# Patient Record
Sex: FEMALE | Race: WHITE | Employment: FULL TIME | URBAN - METROPOLITAN AREA
[De-identification: names, ages, dates, MRNs, and addresses within clinical notes are randomized per-mention and may not be internally consistent; named-entity substitution may affect disease eponyms.]

---

## 2017-01-09 DIAGNOSIS — M16.0 PRIMARY OSTEOARTHRITIS OF BOTH HIPS: ICD-10-CM

## 2017-01-09 RX ORDER — MELOXICAM 15 MG/1
TABLET ORAL
Qty: 30 TABLET | Refills: 2 | Status: SHIPPED | OUTPATIENT
Start: 2017-01-09 | End: 2017-01-23 | Stop reason: SDUPTHER

## 2017-01-18 ENCOUNTER — HOSPITAL ENCOUNTER (OUTPATIENT)
Dept: SURGERY | Age: 57
Discharge: HOME | End: 2017-01-18
Attending: INTERNAL MEDICINE | Admitting: INTERNAL MEDICINE

## 2017-01-18 VITALS
HEART RATE: 71 BPM | SYSTOLIC BLOOD PRESSURE: 127 MMHG | HEIGHT: 64 IN | OXYGEN SATURATION: 100 % | WEIGHT: 245 LBS | RESPIRATION RATE: 18 BRPM | BODY MASS INDEX: 41.83 KG/M2 | DIASTOLIC BLOOD PRESSURE: 84 MMHG | TEMPERATURE: 97.4 F

## 2017-01-18 RX ORDER — LIDOCAINE HYDROCHLORIDE 10 MG/ML
0.1 INJECTION, SOLUTION EPIDURAL; INFILTRATION; INTRACAUDAL; PERINEURAL ONCE
Status: DISCONTINUED | OUTPATIENT
Start: 2017-01-18 | End: 2017-01-19 | Stop reason: HOSPADM

## 2017-01-18 RX ORDER — CETIRIZINE HYDROCHLORIDE 10 MG/1
10 TABLET ORAL DAILY
COMMUNITY
End: 2017-10-16

## 2017-01-18 RX ORDER — SODIUM CHLORIDE, SODIUM LACTATE, POTASSIUM CHLORIDE, CALCIUM CHLORIDE 600; 310; 30; 20 MG/100ML; MG/100ML; MG/100ML; MG/100ML
INJECTION, SOLUTION INTRAVENOUS CONTINUOUS
Status: DISCONTINUED | OUTPATIENT
Start: 2017-01-18 | End: 2017-01-19 | Stop reason: HOSPADM

## 2017-01-18 RX ADMIN — SODIUM CHLORIDE, SODIUM LACTATE, POTASSIUM CHLORIDE, CALCIUM CHLORIDE: 600; 310; 30; 20 INJECTION, SOLUTION INTRAVENOUS at 08:28

## 2017-01-18 ASSESSMENT — PAIN - FUNCTIONAL ASSESSMENT: PAIN_FUNCTIONAL_ASSESSMENT: 0-10

## 2017-01-18 ASSESSMENT — PAIN SCALES - GENERAL: PAINLEVEL_OUTOF10: 0

## 2017-01-23 DIAGNOSIS — M16.0 PRIMARY OSTEOARTHRITIS OF BOTH HIPS: ICD-10-CM

## 2017-01-23 RX ORDER — MELOXICAM 15 MG/1
TABLET ORAL
Qty: 30 TABLET | Refills: 2 | Status: SHIPPED | OUTPATIENT
Start: 2017-01-23 | End: 2017-10-16 | Stop reason: SDUPTHER

## 2017-03-03 ENCOUNTER — OFFICE VISIT (OUTPATIENT)
Dept: FAMILY MEDICINE CLINIC | Age: 57
End: 2017-03-03

## 2017-03-03 VITALS
DIASTOLIC BLOOD PRESSURE: 88 MMHG | HEIGHT: 64 IN | OXYGEN SATURATION: 98 % | HEART RATE: 69 BPM | BODY MASS INDEX: 43.19 KG/M2 | WEIGHT: 253 LBS | SYSTOLIC BLOOD PRESSURE: 122 MMHG

## 2017-03-03 DIAGNOSIS — K63.5 COLONIC POLYP: ICD-10-CM

## 2017-03-03 DIAGNOSIS — I10 ESSENTIAL HYPERTENSION: Primary | ICD-10-CM

## 2017-03-03 DIAGNOSIS — M16.0 PRIMARY OSTEOARTHRITIS OF BOTH HIPS: ICD-10-CM

## 2017-03-03 DIAGNOSIS — M79.642 PAIN OF LEFT HAND: ICD-10-CM

## 2017-03-03 LAB
A/G RATIO: 1.4 (ref 1.1–2.2)
ALBUMIN SERPL-MCNC: 4.4 G/DL (ref 3.4–5)
ALP BLD-CCNC: 65 U/L (ref 40–129)
ALT SERPL-CCNC: 30 U/L (ref 10–40)
ANION GAP SERPL CALCULATED.3IONS-SCNC: 14 MMOL/L (ref 3–16)
AST SERPL-CCNC: 20 U/L (ref 15–37)
BILIRUB SERPL-MCNC: <0.2 MG/DL (ref 0–1)
BUN BLDV-MCNC: 17 MG/DL (ref 7–20)
CALCIUM SERPL-MCNC: 10.2 MG/DL (ref 8.3–10.6)
CHLORIDE BLD-SCNC: 99 MMOL/L (ref 99–110)
CHOLESTEROL, TOTAL: 212 MG/DL (ref 0–199)
CO2: 27 MMOL/L (ref 21–32)
CREAT SERPL-MCNC: 0.7 MG/DL (ref 0.6–1.1)
GFR AFRICAN AMERICAN: >60
GFR NON-AFRICAN AMERICAN: >60
GLOBULIN: 3.1 G/DL
GLUCOSE BLD-MCNC: 91 MG/DL (ref 70–99)
HDLC SERPL-MCNC: 47 MG/DL (ref 40–60)
LDL CHOLESTEROL CALCULATED: 136 MG/DL
POTASSIUM SERPL-SCNC: 4.9 MMOL/L (ref 3.5–5.1)
SODIUM BLD-SCNC: 140 MMOL/L (ref 136–145)
TOTAL PROTEIN: 7.5 G/DL (ref 6.4–8.2)
TRIGL SERPL-MCNC: 144 MG/DL (ref 0–150)
VLDLC SERPL CALC-MCNC: 29 MG/DL

## 2017-03-03 PROCEDURE — 36415 COLL VENOUS BLD VENIPUNCTURE: CPT | Performed by: FAMILY MEDICINE

## 2017-03-03 PROCEDURE — 99214 OFFICE O/P EST MOD 30 MIN: CPT | Performed by: FAMILY MEDICINE

## 2017-03-03 RX ORDER — GABAPENTIN 300 MG/1
300 CAPSULE ORAL 3 TIMES DAILY
Qty: 90 CAPSULE | Refills: 5 | Status: SHIPPED | OUTPATIENT
Start: 2017-03-03 | End: 2017-10-16 | Stop reason: SDUPTHER

## 2017-03-03 RX ORDER — LISINOPRIL 5 MG/1
TABLET ORAL
Qty: 90 TABLET | Refills: 1 | Status: SHIPPED | OUTPATIENT
Start: 2017-03-03 | End: 2017-07-14 | Stop reason: SDUPTHER

## 2017-03-03 RX ORDER — HYDROCODONE BITARTRATE AND ACETAMINOPHEN 5; 325 MG/1; MG/1
TABLET ORAL
Qty: 60 TABLET | Refills: 0 | Status: SHIPPED | OUTPATIENT
Start: 2017-03-03 | End: 2017-10-16 | Stop reason: SDUPTHER

## 2017-03-03 RX ORDER — HYDROCHLOROTHIAZIDE 25 MG/1
TABLET ORAL
Qty: 90 TABLET | Refills: 1 | Status: SHIPPED | OUTPATIENT
Start: 2017-03-03 | End: 2017-10-16 | Stop reason: SDUPTHER

## 2017-03-03 ASSESSMENT — ENCOUNTER SYMPTOMS: RESPIRATORY NEGATIVE: 1

## 2017-03-03 ASSESSMENT — PATIENT HEALTH QUESTIONNAIRE - PHQ9
SUM OF ALL RESPONSES TO PHQ QUESTIONS 1-9: 0
SUM OF ALL RESPONSES TO PHQ9 QUESTIONS 1 & 2: 0
2. FEELING DOWN, DEPRESSED OR HOPELESS: 0
1. LITTLE INTEREST OR PLEASURE IN DOING THINGS: 0

## 2017-03-27 ENCOUNTER — OFFICE VISIT (OUTPATIENT)
Dept: FAMILY MEDICINE CLINIC | Age: 57
End: 2017-03-27

## 2017-03-27 VITALS
WEIGHT: 250 LBS | SYSTOLIC BLOOD PRESSURE: 120 MMHG | HEIGHT: 64 IN | TEMPERATURE: 100.4 F | BODY MASS INDEX: 42.68 KG/M2 | HEART RATE: 98 BPM | OXYGEN SATURATION: 98 % | DIASTOLIC BLOOD PRESSURE: 84 MMHG

## 2017-03-27 DIAGNOSIS — R05.9 COUGH: Primary | ICD-10-CM

## 2017-03-27 DIAGNOSIS — J18.9 PNEUMONIA OF BOTH LOWER LOBES DUE TO INFECTIOUS ORGANISM: ICD-10-CM

## 2017-03-27 DIAGNOSIS — R52 BODY ACHES: ICD-10-CM

## 2017-03-27 PROCEDURE — 99213 OFFICE O/P EST LOW 20 MIN: CPT | Performed by: NURSE PRACTITIONER

## 2017-03-27 RX ORDER — GUAIFENESIN AND CODEINE PHOSPHATE 100; 10 MG/5ML; MG/5ML
5 SOLUTION ORAL 2 TIMES DAILY PRN
Qty: 120 ML | Refills: 0 | Status: SHIPPED | OUTPATIENT
Start: 2017-03-27 | End: 2017-04-03

## 2017-03-27 RX ORDER — DOXYCYCLINE HYCLATE 100 MG
100 TABLET ORAL 2 TIMES DAILY
Qty: 20 TABLET | Refills: 0 | Status: SHIPPED | OUTPATIENT
Start: 2017-03-27 | End: 2017-04-06

## 2017-03-27 RX ORDER — DOXYCYCLINE HYCLATE 100 MG
100 TABLET ORAL 2 TIMES DAILY
Qty: 20 TABLET | Refills: 0 | Status: SHIPPED | OUTPATIENT
Start: 2017-03-27 | End: 2017-03-27 | Stop reason: SDUPTHER

## 2017-03-30 ASSESSMENT — ENCOUNTER SYMPTOMS
GASTROINTESTINAL NEGATIVE: 1
EYES NEGATIVE: 1

## 2017-05-15 DIAGNOSIS — M16.0 PRIMARY OSTEOARTHRITIS OF BOTH HIPS: ICD-10-CM

## 2017-05-15 RX ORDER — MELOXICAM 15 MG/1
TABLET ORAL
Qty: 30 TABLET | Refills: 5 | Status: SHIPPED | OUTPATIENT
Start: 2017-05-15 | End: 2017-10-16 | Stop reason: SDUPTHER

## 2017-07-21 ENCOUNTER — TELEPHONE (OUTPATIENT)
Dept: FAMILY MEDICINE CLINIC | Age: 57
End: 2017-07-21

## 2017-07-21 DIAGNOSIS — N30.90 CYSTITIS: Primary | ICD-10-CM

## 2017-07-21 RX ORDER — CIPROFLOXACIN 250 MG/1
250 TABLET, FILM COATED ORAL 2 TIMES DAILY
Qty: 6 TABLET | Refills: 0 | Status: SHIPPED | OUTPATIENT
Start: 2017-07-21 | End: 2017-07-24

## 2017-08-25 DIAGNOSIS — Z12.31 SCREENING MAMMOGRAM, ENCOUNTER FOR: Primary | ICD-10-CM

## 2017-10-16 ENCOUNTER — OFFICE VISIT (OUTPATIENT)
Dept: FAMILY MEDICINE CLINIC | Age: 57
End: 2017-10-16

## 2017-10-16 VITALS
OXYGEN SATURATION: 97 % | HEART RATE: 75 BPM | DIASTOLIC BLOOD PRESSURE: 94 MMHG | HEIGHT: 64 IN | SYSTOLIC BLOOD PRESSURE: 140 MMHG | WEIGHT: 262 LBS | BODY MASS INDEX: 44.73 KG/M2

## 2017-10-16 DIAGNOSIS — M16.0 PRIMARY OSTEOARTHRITIS OF BOTH HIPS: ICD-10-CM

## 2017-10-16 DIAGNOSIS — B37.31 YEAST VAGINITIS: ICD-10-CM

## 2017-10-16 DIAGNOSIS — K29.00 OTHER ACUTE GASTRITIS WITHOUT HEMORRHAGE: ICD-10-CM

## 2017-10-16 DIAGNOSIS — Z23 NEED FOR IMMUNIZATION AGAINST INFLUENZA: ICD-10-CM

## 2017-10-16 DIAGNOSIS — I10 ESSENTIAL HYPERTENSION: Primary | ICD-10-CM

## 2017-10-16 DIAGNOSIS — F32.89 OTHER DEPRESSION: ICD-10-CM

## 2017-10-16 PROBLEM — K51.419 PSEUDOPOLYP OF SIGMOID COLON WITH COMPLICATION (HCC): Status: ACTIVE | Noted: 2017-03-03

## 2017-10-16 PROCEDURE — 99214 OFFICE O/P EST MOD 30 MIN: CPT | Performed by: FAMILY MEDICINE

## 2017-10-16 PROCEDURE — 90471 IMMUNIZATION ADMIN: CPT | Performed by: FAMILY MEDICINE

## 2017-10-16 PROCEDURE — 90688 IIV4 VACCINE SPLT 0.5 ML IM: CPT | Performed by: FAMILY MEDICINE

## 2017-10-16 RX ORDER — FLUOXETINE HYDROCHLORIDE 20 MG/1
20 CAPSULE ORAL DAILY
Qty: 30 CAPSULE | Refills: 1 | Status: SHIPPED | OUTPATIENT
Start: 2017-10-16 | End: 2017-11-13 | Stop reason: SDUPTHER

## 2017-10-16 RX ORDER — OMEPRAZOLE 20 MG/1
20 TABLET, DELAYED RELEASE ORAL DAILY
Qty: 30 TABLET | Refills: 1 | Status: SHIPPED | OUTPATIENT
Start: 2017-10-16 | End: 2017-12-14 | Stop reason: SDUPTHER

## 2017-10-16 RX ORDER — HYDROCODONE BITARTRATE AND ACETAMINOPHEN 5; 325 MG/1; MG/1
TABLET ORAL
Qty: 60 TABLET | Refills: 0 | Status: SHIPPED | OUTPATIENT
Start: 2017-10-16 | End: 2018-03-14 | Stop reason: SDUPTHER

## 2017-10-16 RX ORDER — MELOXICAM 15 MG/1
TABLET ORAL
Qty: 90 TABLET | Refills: 1 | OUTPATIENT
Start: 2017-10-16

## 2017-10-16 RX ORDER — GABAPENTIN 300 MG/1
300 CAPSULE ORAL 3 TIMES DAILY
Qty: 270 CAPSULE | Refills: 1 | Status: SHIPPED | OUTPATIENT
Start: 2017-10-16 | End: 2018-04-11 | Stop reason: SDUPTHER

## 2017-10-16 RX ORDER — HYDROCHLOROTHIAZIDE 25 MG/1
TABLET ORAL
Qty: 90 TABLET | Refills: 1 | Status: SHIPPED | OUTPATIENT
Start: 2017-10-16 | End: 2018-03-14 | Stop reason: SDUPTHER

## 2017-10-16 RX ORDER — LISINOPRIL 5 MG/1
TABLET ORAL
Qty: 90 TABLET | Refills: 1 | Status: SHIPPED | OUTPATIENT
Start: 2017-10-16 | End: 2018-03-14 | Stop reason: SDUPTHER

## 2017-10-16 RX ORDER — MELOXICAM 15 MG/1
TABLET ORAL
Qty: 90 TABLET | Refills: 1 | Status: SHIPPED | OUTPATIENT
Start: 2017-10-16 | End: 2018-03-14 | Stop reason: SDUPTHER

## 2017-10-20 ASSESSMENT — ENCOUNTER SYMPTOMS: GASTROINTESTINAL NEGATIVE: 1

## 2017-11-13 ENCOUNTER — OFFICE VISIT (OUTPATIENT)
Dept: FAMILY MEDICINE CLINIC | Age: 57
End: 2017-11-13

## 2017-11-13 VITALS
OXYGEN SATURATION: 98 % | WEIGHT: 257 LBS | HEART RATE: 73 BPM | SYSTOLIC BLOOD PRESSURE: 120 MMHG | DIASTOLIC BLOOD PRESSURE: 70 MMHG | HEIGHT: 64 IN | BODY MASS INDEX: 43.87 KG/M2

## 2017-11-13 DIAGNOSIS — I10 ESSENTIAL HYPERTENSION: ICD-10-CM

## 2017-11-13 DIAGNOSIS — H65.01 RIGHT ACUTE SEROUS OTITIS MEDIA, RECURRENCE NOT SPECIFIED: ICD-10-CM

## 2017-11-13 DIAGNOSIS — F32.89 OTHER DEPRESSION: Primary | ICD-10-CM

## 2017-11-13 PROCEDURE — 99213 OFFICE O/P EST LOW 20 MIN: CPT | Performed by: FAMILY MEDICINE

## 2017-11-13 RX ORDER — FLUOXETINE HYDROCHLORIDE 20 MG/1
20 CAPSULE ORAL DAILY
Qty: 30 CAPSULE | Refills: 4 | Status: SHIPPED | OUTPATIENT
Start: 2017-11-13 | End: 2018-03-14 | Stop reason: SDUPTHER

## 2017-11-13 RX ORDER — GUAIFENESIN 600 MG/1
1200 TABLET, EXTENDED RELEASE ORAL 2 TIMES DAILY
Qty: 40 TABLET | Refills: 1 | Status: SHIPPED | OUTPATIENT
Start: 2017-11-13 | End: 2018-03-14 | Stop reason: ALTCHOICE

## 2017-11-13 ASSESSMENT — ENCOUNTER SYMPTOMS: RESPIRATORY NEGATIVE: 1

## 2017-11-13 NOTE — PROGRESS NOTES
extended release tablet; Take 2 tablets by mouth 2 times daily    Essential hypertension           Plan:      .sapna  Return in about 4 months (around 3/13/2018). There are no Patient Instructions on file for this visit.

## 2017-11-29 ENCOUNTER — OFFICE VISIT (OUTPATIENT)
Dept: FAMILY MEDICINE CLINIC | Age: 57
End: 2017-11-29

## 2017-11-29 VITALS
SYSTOLIC BLOOD PRESSURE: 124 MMHG | TEMPERATURE: 98.8 F | WEIGHT: 258 LBS | BODY MASS INDEX: 44.05 KG/M2 | HEIGHT: 64 IN | HEART RATE: 73 BPM | OXYGEN SATURATION: 98 % | DIASTOLIC BLOOD PRESSURE: 81 MMHG

## 2017-11-29 DIAGNOSIS — H66.001 ACUTE SUPPURATIVE OTITIS MEDIA OF RIGHT EAR WITHOUT SPONTANEOUS RUPTURE OF TYMPANIC MEMBRANE, RECURRENCE NOT SPECIFIED: ICD-10-CM

## 2017-11-29 PROCEDURE — 99213 OFFICE O/P EST LOW 20 MIN: CPT | Performed by: FAMILY MEDICINE

## 2017-11-29 RX ORDER — LEVOFLOXACIN 500 MG/1
500 TABLET, FILM COATED ORAL DAILY
Qty: 10 TABLET | Refills: 0 | Status: SHIPPED | OUTPATIENT
Start: 2017-11-29 | End: 2017-12-09

## 2017-11-29 ASSESSMENT — ENCOUNTER SYMPTOMS: GASTROINTESTINAL NEGATIVE: 1

## 2017-11-29 NOTE — PROGRESS NOTES
Subjective:      Patient ID: Tracy Walsh is a 61 y.o. female. HPI  Chief Complaint   Patient presents with    Cough     not coughing anything up, cough is really down deep     Chest Congestion    Dizziness     Chief complaint and present illness: 58-year-old white female presents unaccompanied with a 4 day history of rhinorrhea which is getting better, nonproductive cough popping in the right ear with dizziness and difficulty hearing. No fever. Nonsmoker. Mucinex and Claritin of helped. No contacts. No travel. Review of Systems   Constitutional: Negative. Gastrointestinal: Negative. Skin: Negative. Neurological: Negative for headaches. Objective:   Physical Exam   Constitutional: She appears well-developed and well-nourished. No distress. HENT:   Head: Normocephalic. Right Ear: External ear normal.   Left Ear: External ear normal.   Mouth/Throat: Oropharynx is clear and moist.   Dull, opaque and bulging right TM; left external auditory canal with slight amount of cerumen, visualization of left tympanic membrane slightly limited but what I see looks normal   Eyes: Conjunctivae are normal. No scleral icterus. Neck: Neck supple. Cardiovascular: Normal rate and normal heart sounds. Pulmonary/Chest: Effort normal and breath sounds normal.   Lymphadenopathy:     She has no cervical adenopathy. Neurological: She is alert. Skin: Skin is warm. No rash noted. No cyanosis. No pallor. Nails show no clubbing. Nursing note and vitals reviewed. /81   Pulse 73   Temp 98.8 °F (37.1 °C) (Oral)   Ht 5' 4\" (1.626 m)   Wt 258 lb (117 kg)   LMP 07/04/2013   SpO2 98%   BMI 44.29 kg/m²     Assessment:      Andra Mcrae was seen today for cough, chest congestion and dizziness.     Diagnoses and all orders for this visit:    Acute suppurative otitis media of right ear without spontaneous rupture of tympanic membrane, recurrence not specified  -     levofloxacin (LEVAQUIN) 500 MG tablet; Take 1 tablet by mouth daily for 10 days           Plan:      No Follow-up on file.   Patient Instructions   You may need to see an ENT for drainage of R middle ear effusion

## 2017-12-04 ENCOUNTER — TELEPHONE (OUTPATIENT)
Dept: FAMILY MEDICINE CLINIC | Age: 57
End: 2017-12-04

## 2017-12-04 DIAGNOSIS — H66.014 RECURRENT ACUTE SUPPURATIVE OTITIS MEDIA OF RIGHT EAR WITH SPONTANEOUS RUPTURE OF TYMPANIC MEMBRANE: Primary | ICD-10-CM

## 2017-12-04 NOTE — TELEPHONE ENCOUNTER
I am not as convinced as you are of the bladder spasm being caused by Levaquin. I would suggest trying the Levaquin again. You do not need to try the Levaquin if you're ear is now feeling normal, however.

## 2017-12-05 NOTE — TELEPHONE ENCOUNTER
Dr. Alex Ortiz MD  Address: [address]  Phone: [phone]    Referral was made.

## 2017-12-05 NOTE — TELEPHONE ENCOUNTER
I spoke to pt and informed her of PCP's message. Pt advised her ear isn't better- it's about the same, but she isn't willing to take the Levaquin and risk doing permeant damage to her bladder. Pt said she didn't take the Levaquin today and didn't have the bladder spasm.

## 2017-12-11 ENCOUNTER — TELEPHONE (OUTPATIENT)
Dept: FAMILY MEDICINE CLINIC | Age: 57
End: 2017-12-11

## 2017-12-11 DIAGNOSIS — G47.30 SLEEP APNEA, UNSPECIFIED TYPE: Primary | ICD-10-CM

## 2017-12-11 NOTE — TELEPHONE ENCOUNTER
Patient states that she is needing a new C-PAP machine, is it okay to order a new one? When we call patient back she will tell us where she wants to send the RX too.

## 2017-12-14 DIAGNOSIS — K29.00 OTHER ACUTE GASTRITIS WITHOUT HEMORRHAGE: ICD-10-CM

## 2017-12-14 RX ORDER — NICOTINE POLACRILEX 4 MG/1
GUM, CHEWING ORAL
Qty: 30 TABLET | Refills: 5 | Status: SHIPPED | OUTPATIENT
Start: 2017-12-14 | End: 2018-05-09 | Stop reason: SDUPTHER

## 2017-12-18 NOTE — TELEPHONE ENCOUNTER
I looked up some companies for pt on line that does have at home sleep studies and mailed to the patient.      [id number]

## 2017-12-18 NOTE — TELEPHONE ENCOUNTER
I followed up with pt she said she got her machine to work but it's not working right, pt said she is going to wait until after the first of the year

## 2018-03-14 ENCOUNTER — OFFICE VISIT (OUTPATIENT)
Dept: FAMILY MEDICINE CLINIC | Age: 58
End: 2018-03-14

## 2018-03-14 DIAGNOSIS — F32.89 OTHER DEPRESSION: ICD-10-CM

## 2018-03-14 DIAGNOSIS — K29.00 OTHER ACUTE GASTRITIS WITHOUT HEMORRHAGE: ICD-10-CM

## 2018-03-14 DIAGNOSIS — I10 ESSENTIAL HYPERTENSION: ICD-10-CM

## 2018-03-14 DIAGNOSIS — M16.0 PRIMARY OSTEOARTHRITIS OF BOTH HIPS: ICD-10-CM

## 2018-03-14 LAB
ALBUMIN SERPL-MCNC: 4.6 G/DL (ref 3.4–5)
ANION GAP SERPL CALCULATED.3IONS-SCNC: 16 MMOL/L (ref 3–16)
BUN BLDV-MCNC: 17 MG/DL (ref 7–20)
CALCIUM SERPL-MCNC: 9.8 MG/DL (ref 8.3–10.6)
CHLORIDE BLD-SCNC: 103 MMOL/L (ref 99–110)
CHOLESTEROL, TOTAL: 234 MG/DL (ref 0–199)
CO2: 23 MMOL/L (ref 21–32)
CREAT SERPL-MCNC: 0.7 MG/DL (ref 0.6–1.1)
GFR AFRICAN AMERICAN: >60
GFR NON-AFRICAN AMERICAN: >60
GLUCOSE BLD-MCNC: 102 MG/DL (ref 70–99)
HDLC SERPL-MCNC: 49 MG/DL (ref 40–60)
LDL CHOLESTEROL CALCULATED: 162 MG/DL
PHOSPHORUS: 3.4 MG/DL (ref 2.5–4.9)
POTASSIUM SERPL-SCNC: 4.5 MMOL/L (ref 3.5–5.1)
SODIUM BLD-SCNC: 142 MMOL/L (ref 136–145)
TRIGL SERPL-MCNC: 114 MG/DL (ref 0–150)
VLDLC SERPL CALC-MCNC: 23 MG/DL

## 2018-03-14 PROCEDURE — 36415 COLL VENOUS BLD VENIPUNCTURE: CPT | Performed by: FAMILY MEDICINE

## 2018-03-14 PROCEDURE — 99214 OFFICE O/P EST MOD 30 MIN: CPT | Performed by: FAMILY MEDICINE

## 2018-03-14 RX ORDER — RANITIDINE 150 MG/1
150 TABLET ORAL 2 TIMES DAILY
Qty: 60 TABLET | Refills: 0 | Status: SHIPPED | OUTPATIENT
Start: 2018-03-14 | End: 2018-09-05

## 2018-03-14 RX ORDER — FLUOXETINE HYDROCHLORIDE 20 MG/1
20 CAPSULE ORAL DAILY
Qty: 90 CAPSULE | Refills: 1 | Status: SHIPPED
Start: 2018-03-14 | End: 2019-07-15

## 2018-03-14 RX ORDER — NICOTINE POLACRILEX 4 MG/1
GUM, CHEWING ORAL
Qty: 30 TABLET | Refills: 5 | Status: CANCELLED | OUTPATIENT
Start: 2018-03-14

## 2018-03-14 RX ORDER — GLUCOSAMINE/D3/BOSWELLIA SERRA 1500MG-400
TABLET ORAL
COMMUNITY
End: 2021-04-07 | Stop reason: CLARIF

## 2018-03-14 RX ORDER — HYDROCODONE BITARTRATE AND ACETAMINOPHEN 5; 325 MG/1; MG/1
TABLET ORAL
Qty: 60 TABLET | Refills: 0 | Status: SHIPPED | OUTPATIENT
Start: 2018-03-14 | End: 2018-09-05 | Stop reason: SDUPTHER

## 2018-03-14 RX ORDER — HYDROCHLOROTHIAZIDE 25 MG/1
TABLET ORAL
Qty: 90 TABLET | Refills: 1 | Status: SHIPPED | OUTPATIENT
Start: 2018-03-14 | End: 2018-09-05 | Stop reason: SDUPTHER

## 2018-03-14 RX ORDER — GABAPENTIN 300 MG/1
300 CAPSULE ORAL 3 TIMES DAILY
Qty: 270 CAPSULE | Refills: 1 | Status: CANCELLED | OUTPATIENT
Start: 2018-03-14 | End: 2018-09-10

## 2018-03-14 RX ORDER — MELOXICAM 15 MG/1
TABLET ORAL
Qty: 90 TABLET | Refills: 1 | Status: SHIPPED | OUTPATIENT
Start: 2018-03-14 | End: 2018-06-05 | Stop reason: SDUPTHER

## 2018-03-14 RX ORDER — LISINOPRIL 5 MG/1
TABLET ORAL
Qty: 90 TABLET | Refills: 1 | Status: SHIPPED | OUTPATIENT
Start: 2018-03-14 | End: 2018-09-05 | Stop reason: SDUPTHER

## 2018-03-14 ASSESSMENT — PATIENT HEALTH QUESTIONNAIRE - PHQ9
2. FEELING DOWN, DEPRESSED OR HOPELESS: 0
1. LITTLE INTEREST OR PLEASURE IN DOING THINGS: 0
SUM OF ALL RESPONSES TO PHQ QUESTIONS 1-9: 0
SUM OF ALL RESPONSES TO PHQ9 QUESTIONS 1 & 2: 0

## 2018-03-17 VITALS
WEIGHT: 262 LBS | HEIGHT: 64 IN | BODY MASS INDEX: 44.73 KG/M2 | HEART RATE: 65 BPM | SYSTOLIC BLOOD PRESSURE: 140 MMHG | DIASTOLIC BLOOD PRESSURE: 80 MMHG | OXYGEN SATURATION: 97 %

## 2018-03-17 NOTE — PATIENT INSTRUCTIONS
examiner believes we discontinued omeprazole but not sure; did discuss continuing antidepressant and when we might discontinue antidepressant

## 2018-03-20 RX ORDER — ATORVASTATIN CALCIUM 20 MG/1
20 TABLET, FILM COATED ORAL DAILY
Qty: 30 TABLET | Refills: 3 | Status: SHIPPED | OUTPATIENT
Start: 2018-03-20 | End: 2018-09-05 | Stop reason: SINTOL

## 2018-04-06 ENCOUNTER — OFFICE VISIT (OUTPATIENT)
Dept: FAMILY MEDICINE CLINIC | Age: 58
End: 2018-04-06

## 2018-04-06 VITALS
BODY MASS INDEX: 44.73 KG/M2 | TEMPERATURE: 98.4 F | OXYGEN SATURATION: 98 % | HEIGHT: 64 IN | HEART RATE: 74 BPM | WEIGHT: 262 LBS | SYSTOLIC BLOOD PRESSURE: 138 MMHG | DIASTOLIC BLOOD PRESSURE: 76 MMHG

## 2018-04-06 DIAGNOSIS — H66.003 ACUTE SUPPURATIVE OTITIS MEDIA OF BOTH EARS WITHOUT SPONTANEOUS RUPTURE OF TYMPANIC MEMBRANES, RECURRENCE NOT SPECIFIED: Primary | ICD-10-CM

## 2018-04-06 PROCEDURE — 99213 OFFICE O/P EST LOW 20 MIN: CPT | Performed by: FAMILY MEDICINE

## 2018-04-06 RX ORDER — PROMETHAZINE HYDROCHLORIDE AND CODEINE PHOSPHATE 6.25; 1 MG/5ML; MG/5ML
5 SYRUP ORAL EVERY 6 HOURS PRN
Qty: 120 ML | Refills: 1 | Status: SHIPPED | OUTPATIENT
Start: 2018-04-06 | End: 2018-04-16

## 2018-04-06 RX ORDER — CEFUROXIME AXETIL 500 MG/1
500 TABLET ORAL 2 TIMES DAILY
Qty: 20 TABLET | Refills: 0 | Status: SHIPPED | OUTPATIENT
Start: 2018-04-06 | End: 2018-04-16

## 2018-04-06 ASSESSMENT — ENCOUNTER SYMPTOMS: GASTROINTESTINAL NEGATIVE: 1

## 2018-04-10 ENCOUNTER — TELEPHONE (OUTPATIENT)
Dept: FAMILY MEDICINE CLINIC | Age: 58
End: 2018-04-10

## 2018-04-10 RX ORDER — DOXYCYCLINE HYCLATE 100 MG
100 TABLET ORAL 2 TIMES DAILY
Qty: 20 TABLET | Refills: 0 | Status: SHIPPED | OUTPATIENT
Start: 2018-04-10 | End: 2018-04-20

## 2018-04-17 ENCOUNTER — TELEPHONE (OUTPATIENT)
Dept: FAMILY MEDICINE CLINIC | Age: 58
End: 2018-04-17

## 2018-05-09 DIAGNOSIS — K29.00 OTHER ACUTE GASTRITIS WITHOUT HEMORRHAGE: ICD-10-CM

## 2018-05-09 RX ORDER — NICOTINE POLACRILEX 4 MG/1
GUM, CHEWING ORAL
Qty: 30 TABLET | Refills: 5 | Status: SHIPPED | OUTPATIENT
Start: 2018-05-09 | End: 2018-06-06 | Stop reason: CLARIF

## 2018-06-05 DIAGNOSIS — M16.0 PRIMARY OSTEOARTHRITIS OF BOTH HIPS: ICD-10-CM

## 2018-06-05 RX ORDER — OMEPRAZOLE 20 MG/1
20 CAPSULE, DELAYED RELEASE ORAL DAILY
Qty: 90 CAPSULE | Refills: 0 | Status: SHIPPED | OUTPATIENT
Start: 2018-06-05 | End: 2018-06-06 | Stop reason: SDUPTHER

## 2018-06-05 RX ORDER — MELOXICAM 15 MG/1
TABLET ORAL
Qty: 90 TABLET | Refills: 0 | Status: SHIPPED | OUTPATIENT
Start: 2018-06-05 | End: 2018-06-06 | Stop reason: SDUPTHER

## 2018-06-06 ENCOUNTER — TELEPHONE (OUTPATIENT)
Dept: FAMILY MEDICINE CLINIC | Age: 58
End: 2018-06-06

## 2018-06-06 DIAGNOSIS — M16.0 PRIMARY OSTEOARTHRITIS OF BOTH HIPS: ICD-10-CM

## 2018-06-06 RX ORDER — OMEPRAZOLE 20 MG/1
20 CAPSULE, DELAYED RELEASE ORAL DAILY
Qty: 90 CAPSULE | Refills: 0 | Status: SHIPPED | OUTPATIENT
Start: 2018-06-06 | End: 2018-08-31 | Stop reason: SDUPTHER

## 2018-06-06 RX ORDER — MELOXICAM 15 MG/1
TABLET ORAL
Qty: 90 TABLET | Refills: 0 | Status: SHIPPED | OUTPATIENT
Start: 2018-06-06 | End: 2018-09-05 | Stop reason: ALTCHOICE

## 2018-07-30 RX ORDER — FLUTICASONE PROPIONATE 50 MCG
SPRAY, SUSPENSION (ML) NASAL
Qty: 1 BOTTLE | Refills: 10 | Status: SHIPPED | OUTPATIENT
Start: 2018-07-30 | End: 2018-09-05 | Stop reason: SDUPTHER

## 2018-08-31 RX ORDER — OMEPRAZOLE 20 MG/1
CAPSULE, DELAYED RELEASE ORAL
Qty: 90 CAPSULE | Refills: 0 | Status: SHIPPED | OUTPATIENT
Start: 2018-08-31 | End: 2018-09-05 | Stop reason: SDUPTHER

## 2018-09-05 ENCOUNTER — OFFICE VISIT (OUTPATIENT)
Dept: FAMILY MEDICINE CLINIC | Age: 58
End: 2018-09-05

## 2018-09-05 VITALS
OXYGEN SATURATION: 98 % | SYSTOLIC BLOOD PRESSURE: 136 MMHG | BODY MASS INDEX: 42.51 KG/M2 | HEART RATE: 79 BPM | WEIGHT: 249 LBS | DIASTOLIC BLOOD PRESSURE: 80 MMHG | HEIGHT: 64 IN

## 2018-09-05 DIAGNOSIS — E78.2 MIXED HYPERLIPIDEMIA: ICD-10-CM

## 2018-09-05 DIAGNOSIS — G47.33 OSA (OBSTRUCTIVE SLEEP APNEA): ICD-10-CM

## 2018-09-05 DIAGNOSIS — I10 ESSENTIAL HYPERTENSION: Primary | ICD-10-CM

## 2018-09-05 DIAGNOSIS — M16.0 PRIMARY OSTEOARTHRITIS OF BOTH HIPS: ICD-10-CM

## 2018-09-05 LAB
ALBUMIN SERPL-MCNC: 4.4 G/DL (ref 3.4–5)
ANION GAP SERPL CALCULATED.3IONS-SCNC: 15 MMOL/L (ref 3–16)
BUN BLDV-MCNC: 21 MG/DL (ref 7–20)
CALCIUM SERPL-MCNC: 10.2 MG/DL (ref 8.3–10.6)
CHLORIDE BLD-SCNC: 99 MMOL/L (ref 99–110)
CHOLESTEROL, TOTAL: 228 MG/DL (ref 0–199)
CO2: 25 MMOL/L (ref 21–32)
CREAT SERPL-MCNC: 0.7 MG/DL (ref 0.6–1.1)
GFR AFRICAN AMERICAN: >60
GFR NON-AFRICAN AMERICAN: >60
GLUCOSE BLD-MCNC: 97 MG/DL (ref 70–99)
HDLC SERPL-MCNC: 49 MG/DL (ref 40–60)
LDL CHOLESTEROL CALCULATED: 162 MG/DL
PHOSPHORUS: 3.7 MG/DL (ref 2.5–4.9)
POTASSIUM SERPL-SCNC: 5 MMOL/L (ref 3.5–5.1)
SODIUM BLD-SCNC: 139 MMOL/L (ref 136–145)
TRIGL SERPL-MCNC: 84 MG/DL (ref 0–150)
VLDLC SERPL CALC-MCNC: 17 MG/DL

## 2018-09-05 PROCEDURE — 36415 COLL VENOUS BLD VENIPUNCTURE: CPT | Performed by: FAMILY MEDICINE

## 2018-09-05 PROCEDURE — 90471 IMMUNIZATION ADMIN: CPT | Performed by: FAMILY MEDICINE

## 2018-09-05 PROCEDURE — 90688 IIV4 VACCINE SPLT 0.5 ML IM: CPT | Performed by: FAMILY MEDICINE

## 2018-09-05 PROCEDURE — 99214 OFFICE O/P EST MOD 30 MIN: CPT | Performed by: FAMILY MEDICINE

## 2018-09-05 RX ORDER — FLUTICASONE PROPIONATE 50 MCG
SPRAY, SUSPENSION (ML) NASAL
Qty: 1 BOTTLE | Refills: 10 | Status: SHIPPED
Start: 2018-09-05 | End: 2019-07-15

## 2018-09-05 RX ORDER — HYDROCHLOROTHIAZIDE 25 MG/1
TABLET ORAL
Qty: 90 TABLET | Refills: 1 | Status: SHIPPED
Start: 2018-09-05 | End: 2019-07-15

## 2018-09-05 RX ORDER — HYDROCODONE BITARTRATE AND ACETAMINOPHEN 5; 325 MG/1; MG/1
1 TABLET ORAL EVERY 6 HOURS PRN
Qty: 60 TABLET | Refills: 0 | Status: SHIPPED | OUTPATIENT
Start: 2018-09-05 | End: 2018-10-05

## 2018-09-05 RX ORDER — LISINOPRIL 5 MG/1
TABLET ORAL
Qty: 90 TABLET | Refills: 1 | Status: SHIPPED | OUTPATIENT
Start: 2018-09-05 | End: 2019-07-15 | Stop reason: SDUPTHER

## 2018-09-05 RX ORDER — GABAPENTIN 300 MG/1
CAPSULE ORAL
Qty: 270 CAPSULE | Refills: 1 | Status: SHIPPED | OUTPATIENT
Start: 2018-09-05 | End: 2020-01-13 | Stop reason: SDUPTHER

## 2018-09-05 RX ORDER — OMEPRAZOLE 20 MG/1
CAPSULE, DELAYED RELEASE ORAL
Qty: 90 CAPSULE | Refills: 1 | Status: SHIPPED | OUTPATIENT
Start: 2018-09-05 | End: 2019-01-07 | Stop reason: SDUPTHER

## 2018-09-05 NOTE — PATIENT INSTRUCTIONS
Stop omeprazole and see if you need it now that you have stopped the meloxicam      Stop prozac :[id number] stop abruptly or take every other day for a few weeks.

## 2018-09-05 NOTE — PROGRESS NOTES
Lab preformed in R arm and sent number of tubes below to be processed. 2 attempt made and stopped bleeding by applying band aide and guaze.      1 Red    0 purple    0 blue    0 Urine

## 2018-09-05 NOTE — PROGRESS NOTES
Subjective:      Patient ID: Yuridia Stein is a 61 y.o. female. HPI  Chief Complaint   Patient presents with    Hypertension    Depression     wants to know if she can stop taking prozac     Arthritis    Medication Refill     OARRS 9/5/18     Other     needs flu vaccine    Hyperlipidemia     reasonsa for stopping:Patient says that since she started taking atorvastain, for 10 days now she can barely stay awake, muscle pain, disoriented, heart palpitations. Please advise.  Sleep Apnea     old machine;will check re where she gets it;    Back Pain     Has used 60 Vicodin in roughly 5 months; uses so she can ambulate when she has to and she is having lots of trouble with either her back or her hip. Reports no side effects or problems. Chief complaint and present illness: 26-year-old white female presents unaccompanied for routine follow-up. Feels great. Is on ketogenic diet and has lost 13 pounds. .is off number of her medications. Please see chart. Review of Systems   All other systems reviewed and are negative. background/entire past medical,social and family history obtained and reviewed/updated today   Objective:   Physical Exam   Constitutional: She appears well-developed and well-nourished. Neck: Neck supple. No thyromegaly present. Cardiovascular: Normal rate, regular rhythm and normal heart sounds. Pulmonary/Chest: Effort normal and breath sounds normal.   Musculoskeletal: She exhibits no edema. Skin: Skin is warm. Nursing note and vitals reviewed. /80   Pulse 79   Ht 5' 4\" (1.626 m)   Wt 249 lb (112.9 kg)   LMP 07/04/2013   SpO2 98%   BMI 42.74 kg/m²   Controlled Substances Monitoring:     RX Monitoring 9/5/2018   Attestation The Prescription Monitoring Report for this patient was reviewed today. Documentation Possible medication side effects, risk of tolerance/dependence & alternative treatments discussed. ;No signs of potential drug abuse or diversion

## 2018-09-10 ENCOUNTER — TELEPHONE (OUTPATIENT)
Dept: FAMILY MEDICINE CLINIC | Age: 58
End: 2018-09-10

## 2018-09-10 DIAGNOSIS — E78.5 HYPERLIPIDEMIA, UNSPECIFIED HYPERLIPIDEMIA TYPE: Primary | ICD-10-CM

## 2018-09-10 RX ORDER — ROSUVASTATIN CALCIUM 5 MG/1
5 TABLET, COATED ORAL DAILY
Qty: 30 TABLET | Refills: 3 | Status: SHIPPED | OUTPATIENT
Start: 2018-09-10 | End: 2019-01-08 | Stop reason: SDUPTHER

## 2018-09-12 NOTE — TELEPHONE ENCOUNTER
Received the information on the fax machine. Gave the information to Dr. Gaetano García  to look at.

## 2018-10-29 ENCOUNTER — OFFICE VISIT (OUTPATIENT)
Dept: FAMILY MEDICINE CLINIC | Age: 58
End: 2018-10-29
Payer: COMMERCIAL

## 2018-10-29 ENCOUNTER — TELEPHONE (OUTPATIENT)
Dept: FAMILY MEDICINE CLINIC | Age: 58
End: 2018-10-29

## 2018-10-29 VITALS
OXYGEN SATURATION: 98 % | BODY MASS INDEX: 40.85 KG/M2 | HEART RATE: 71 BPM | WEIGHT: 238 LBS | DIASTOLIC BLOOD PRESSURE: 60 MMHG | SYSTOLIC BLOOD PRESSURE: 108 MMHG | TEMPERATURE: 100.5 F

## 2018-10-29 DIAGNOSIS — J02.9 SORE THROAT: ICD-10-CM

## 2018-10-29 DIAGNOSIS — R09.81 SINUS CONGESTION: ICD-10-CM

## 2018-10-29 DIAGNOSIS — R09.81 SINUS CONGESTION: Primary | ICD-10-CM

## 2018-10-29 LAB — S PYO AG THROAT QL: NORMAL

## 2018-10-29 PROCEDURE — 87880 STREP A ASSAY W/OPTIC: CPT | Performed by: NURSE PRACTITIONER

## 2018-10-29 PROCEDURE — 99214 OFFICE O/P EST MOD 30 MIN: CPT | Performed by: NURSE PRACTITIONER

## 2018-10-29 RX ORDER — GUAIFENESIN 600 MG/1
600 TABLET, EXTENDED RELEASE ORAL 2 TIMES DAILY
Qty: 30 TABLET | Refills: 0 | Status: SHIPPED
Start: 2018-10-29 | End: 2019-07-15 | Stop reason: ALTCHOICE

## 2018-10-29 RX ORDER — CETIRIZINE HYDROCHLORIDE 10 MG/1
10 TABLET ORAL DAILY
Qty: 30 TABLET | Refills: 2 | Status: SHIPPED | OUTPATIENT
Start: 2018-10-29 | End: 2018-10-29 | Stop reason: SDUPTHER

## 2018-10-29 RX ORDER — CETIRIZINE HYDROCHLORIDE 10 MG/1
10 TABLET ORAL DAILY
Qty: 30 TABLET | Refills: 2 | Status: SHIPPED
Start: 2018-10-29 | End: 2019-07-15 | Stop reason: ALTCHOICE

## 2018-10-29 RX ORDER — MELOXICAM 15 MG/1
15 TABLET ORAL DAILY
COMMUNITY
End: 2019-02-25

## 2018-10-29 RX ORDER — GUAIFENESIN 600 MG/1
600 TABLET, EXTENDED RELEASE ORAL 2 TIMES DAILY
Qty: 30 TABLET | Refills: 0 | Status: SHIPPED | OUTPATIENT
Start: 2018-10-29 | End: 2018-10-29 | Stop reason: SDUPTHER

## 2018-10-29 ASSESSMENT — ENCOUNTER SYMPTOMS
CONSTIPATION: 0
SHORTNESS OF BREATH: 0
NAUSEA: 0
ABDOMINAL PAIN: 0
CHEST TIGHTNESS: 0
DIARRHEA: 0
COUGH: 1
RHINORRHEA: 1
WHEEZING: 0
SINUS COMPLAINT: 1
SINUS PRESSURE: 0
SWOLLEN GLANDS: 1
SORE THROAT: 1
VOMITING: 0

## 2018-10-30 ENCOUNTER — TELEPHONE (OUTPATIENT)
Dept: FAMILY MEDICINE CLINIC | Age: 58
End: 2018-10-30

## 2018-11-06 ENCOUNTER — OFFICE VISIT (OUTPATIENT)
Dept: FAMILY MEDICINE CLINIC | Age: 58
End: 2018-11-06
Payer: COMMERCIAL

## 2018-11-06 VITALS
WEIGHT: 248 LBS | DIASTOLIC BLOOD PRESSURE: 82 MMHG | HEART RATE: 64 BPM | HEIGHT: 64 IN | OXYGEN SATURATION: 99 % | TEMPERATURE: 98.7 F | SYSTOLIC BLOOD PRESSURE: 118 MMHG | BODY MASS INDEX: 42.34 KG/M2

## 2018-11-06 DIAGNOSIS — E78.5 HYPERLIPIDEMIA, UNSPECIFIED HYPERLIPIDEMIA TYPE: ICD-10-CM

## 2018-11-06 DIAGNOSIS — J02.9 ACUTE PHARYNGITIS, UNSPECIFIED ETIOLOGY: Primary | ICD-10-CM

## 2018-11-06 LAB — S PYO AG THROAT QL: NORMAL

## 2018-11-06 PROCEDURE — 99214 OFFICE O/P EST MOD 30 MIN: CPT | Performed by: NURSE PRACTITIONER

## 2018-11-06 PROCEDURE — 36415 COLL VENOUS BLD VENIPUNCTURE: CPT | Performed by: FAMILY MEDICINE

## 2018-11-06 PROCEDURE — 87880 STREP A ASSAY W/OPTIC: CPT | Performed by: NURSE PRACTITIONER

## 2018-11-06 RX ORDER — METHYLPREDNISOLONE 4 MG/1
TABLET ORAL
Qty: 1 KIT | Refills: 0 | Status: SHIPPED | OUTPATIENT
Start: 2018-11-06 | End: 2018-11-12

## 2018-11-06 ASSESSMENT — ENCOUNTER SYMPTOMS
SORE THROAT: 1
DIARRHEA: 0
CONSTIPATION: 0
VOMITING: 0
COUGH: 1
SHORTNESS OF BREATH: 0
RHINORRHEA: 1
SINUS PRESSURE: 1
SWOLLEN GLANDS: 1
SINUS PAIN: 0
WHEEZING: 0
NAUSEA: 0

## 2018-11-06 NOTE — PROGRESS NOTES
Blood drawn per order. Needle size: 21 g  Site: L Antecubital.  First attempt successful Yes    Second attempt na    Pressure applied until bleeding stopped. Cotton ball and band aid  applied. Patient informed to call office or return if bleeding reoccurs and unable to stop.     Tubes drawn: 0 purple     1 red

## 2018-11-06 NOTE — PROGRESS NOTES
[facility]  [facility]  [place] [address]  [address] [address]  Dept: [phone]  Dept Fax: [phone]    Nel Sanford is a 61 y.o. female who presents today for hermedical conditions/complaints as noted below. Nel Sanford is c/o of Pharyngitis (states this is worse then it was when she was here 10/29); Cough (non productive ); and Fatigue    Chief Complaint   Patient presents with    Pharyngitis     states this is worse then it was when she was here 10/29    Cough     non productive     Fatigue     HPI:     Pharyngitis   This is a recurrent problem. The current episode started 1 to 4 weeks ago. The problem occurs constantly. The problem has been unchanged. Associated symptoms include chills, congestion, coughing, fatigue, a sore throat (Sore tongue) and swollen glands. Pertinent negatives include no chest pain, diaphoresis, fever, headaches, nausea, rash, vomiting or weakness. The symptoms are aggravated by drinking, eating and coughing. She has tried NSAIDs (Mucinex, decongestant, nasal rinse) for the symptoms. The treatment provided no relief. Subjective:     Review of Systems   Constitutional: Positive for chills and fatigue. Negative for diaphoresis and fever. HENT: Positive for congestion, mouth sores (Posterior tongue), postnasal drip, rhinorrhea, sinus pressure and sore throat (Sore tongue). Negative for sinus pain and sneezing. Respiratory: Positive for cough. Negative for shortness of breath and wheezing. Cardiovascular: Negative for chest pain, palpitations and leg swelling. Gastrointestinal: Negative for constipation, diarrhea, nausea and vomiting. Genitourinary: Negative for dysuria and urgency. Skin: Negative for rash. Neurological: Negative for dizziness, seizures, weakness and headaches. Hematological: Does not bruise/bleed easily. Psychiatric/Behavioral: The patient is not nervous/anxious.       Objective:     Vitals:

## 2018-11-07 LAB
CHOLESTEROL, TOTAL: 138 MG/DL (ref 0–199)
HDLC SERPL-MCNC: 43 MG/DL (ref 40–60)
LDL CHOLESTEROL CALCULATED: 78 MG/DL
TRIGL SERPL-MCNC: 86 MG/DL (ref 0–150)
VLDLC SERPL CALC-MCNC: 17 MG/DL

## 2018-11-13 ENCOUNTER — TELEPHONE (OUTPATIENT)
Dept: FAMILY MEDICINE CLINIC | Age: 58
End: 2018-11-13

## 2018-11-13 DIAGNOSIS — J02.9 ACUTE PHARYNGITIS, UNSPECIFIED ETIOLOGY: Primary | ICD-10-CM

## 2018-11-14 RX ORDER — DOXYCYCLINE HYCLATE 100 MG/1
100 CAPSULE ORAL 2 TIMES DAILY
Qty: 14 CAPSULE | Refills: 0 | Status: SHIPPED | OUTPATIENT
Start: 2018-11-14 | End: 2018-11-21

## 2018-11-27 RX ORDER — OMEPRAZOLE 20 MG/1
CAPSULE, DELAYED RELEASE ORAL
Qty: 90 CAPSULE | Refills: 0 | OUTPATIENT
Start: 2018-11-27

## 2019-01-04 RX ORDER — OMEPRAZOLE 20 MG/1
CAPSULE, DELAYED RELEASE ORAL
Qty: 90 CAPSULE | Refills: 0 | OUTPATIENT
Start: 2019-01-04

## 2019-01-07 RX ORDER — OMEPRAZOLE 20 MG/1
CAPSULE, DELAYED RELEASE ORAL
Qty: 90 CAPSULE | Refills: 0 | Status: SHIPPED | OUTPATIENT
Start: 2019-01-07 | End: 2019-04-10 | Stop reason: SDUPTHER

## 2019-01-08 RX ORDER — ROSUVASTATIN CALCIUM 5 MG/1
TABLET, COATED ORAL
Qty: 30 TABLET | Refills: 2 | Status: SHIPPED | OUTPATIENT
Start: 2019-01-08 | End: 2019-04-19 | Stop reason: SDUPTHER

## 2019-02-20 ENCOUNTER — TELEPHONE (OUTPATIENT)
Dept: FAMILY MEDICINE CLINIC | Age: 59
End: 2019-02-20

## 2019-02-25 ENCOUNTER — OFFICE VISIT (OUTPATIENT)
Dept: FAMILY MEDICINE CLINIC | Age: 59
End: 2019-02-25
Payer: COMMERCIAL

## 2019-02-25 VITALS
DIASTOLIC BLOOD PRESSURE: 70 MMHG | HEART RATE: 90 BPM | TEMPERATURE: 98.8 F | BODY MASS INDEX: 42.05 KG/M2 | SYSTOLIC BLOOD PRESSURE: 110 MMHG | WEIGHT: 245 LBS | OXYGEN SATURATION: 96 %

## 2019-02-25 DIAGNOSIS — E87.6 HYPOKALEMIA: ICD-10-CM

## 2019-02-25 DIAGNOSIS — A09 ACUTE INFECTIOUS DIARRHEA: Primary | ICD-10-CM

## 2019-02-25 PROCEDURE — 99213 OFFICE O/P EST LOW 20 MIN: CPT | Performed by: FAMILY MEDICINE

## 2019-02-25 RX ORDER — DICYCLOMINE HYDROCHLORIDE 10 MG/1
10 CAPSULE ORAL 4 TIMES DAILY
COMMUNITY
Start: 2019-02-22 | End: 2019-02-25 | Stop reason: SDUPTHER

## 2019-02-25 RX ORDER — POTASSIUM CHLORIDE 20 MEQ/1
20 TABLET, EXTENDED RELEASE ORAL 2 TIMES DAILY
Qty: 30 TABLET | Refills: 0 | Status: SHIPPED | OUTPATIENT
Start: 2019-02-25 | End: 2019-03-09 | Stop reason: SDUPTHER

## 2019-02-25 RX ORDER — DICYCLOMINE HYDROCHLORIDE 10 MG/1
10 CAPSULE ORAL 4 TIMES DAILY
Qty: 60 CAPSULE | Refills: 0 | Status: SHIPPED | OUTPATIENT
Start: 2019-02-25 | End: 2019-03-09 | Stop reason: SDUPTHER

## 2019-02-25 ASSESSMENT — PATIENT HEALTH QUESTIONNAIRE - PHQ9
SUM OF ALL RESPONSES TO PHQ QUESTIONS 1-9: 0
SUM OF ALL RESPONSES TO PHQ9 QUESTIONS 1 & 2: 0
2. FEELING DOWN, DEPRESSED OR HOPELESS: 0
SUM OF ALL RESPONSES TO PHQ QUESTIONS 1-9: 0
1. LITTLE INTEREST OR PLEASURE IN DOING THINGS: 0

## 2019-02-25 ASSESSMENT — ENCOUNTER SYMPTOMS
BLOOD IN STOOL: 0
DIARRHEA: 1
VOMITING: 0
RESPIRATORY NEGATIVE: 1
NAUSEA: 1

## 2019-02-28 ENCOUNTER — TELEPHONE (OUTPATIENT)
Dept: FAMILY MEDICINE CLINIC | Age: 59
End: 2019-02-28

## 2019-03-01 ENCOUNTER — TELEPHONE (OUTPATIENT)
Dept: FAMILY MEDICINE CLINIC | Age: 59
End: 2019-03-01

## 2019-03-09 DIAGNOSIS — A09 ACUTE INFECTIOUS DIARRHEA: ICD-10-CM

## 2019-03-09 DIAGNOSIS — E87.6 HYPOKALEMIA: ICD-10-CM

## 2019-03-11 RX ORDER — DICYCLOMINE HYDROCHLORIDE 10 MG/1
CAPSULE ORAL
Qty: 60 CAPSULE | Refills: 0 | Status: SHIPPED | OUTPATIENT
Start: 2019-03-11 | End: 2019-03-27 | Stop reason: SDUPTHER

## 2019-03-11 RX ORDER — POTASSIUM CHLORIDE 20 MEQ/1
TABLET, EXTENDED RELEASE ORAL
Qty: 30 TABLET | Refills: 0 | Status: SHIPPED | OUTPATIENT
Start: 2019-03-11 | End: 2019-03-25 | Stop reason: SDUPTHER

## 2019-03-25 DIAGNOSIS — E87.6 HYPOKALEMIA: ICD-10-CM

## 2019-03-25 RX ORDER — POTASSIUM CHLORIDE 20 MEQ/1
TABLET, EXTENDED RELEASE ORAL
Qty: 30 TABLET | Refills: 0 | Status: SHIPPED | OUTPATIENT
Start: 2019-03-25 | End: 2019-04-10 | Stop reason: SDUPTHER

## 2019-03-27 DIAGNOSIS — A09 ACUTE INFECTIOUS DIARRHEA: ICD-10-CM

## 2019-03-27 RX ORDER — DICYCLOMINE HYDROCHLORIDE 10 MG/1
CAPSULE ORAL
Qty: 60 CAPSULE | Refills: 0 | Status: SHIPPED | OUTPATIENT
Start: 2019-03-27 | End: 2019-04-10 | Stop reason: SDUPTHER

## 2019-04-10 DIAGNOSIS — E87.6 HYPOKALEMIA: ICD-10-CM

## 2019-04-10 DIAGNOSIS — A09 ACUTE INFECTIOUS DIARRHEA: ICD-10-CM

## 2019-04-10 RX ORDER — DICYCLOMINE HYDROCHLORIDE 10 MG/1
CAPSULE ORAL
Qty: 60 CAPSULE | Refills: 0 | Status: SHIPPED | OUTPATIENT
Start: 2019-04-10 | End: 2019-04-26 | Stop reason: SDUPTHER

## 2019-04-10 RX ORDER — POTASSIUM CHLORIDE 20 MEQ/1
TABLET, EXTENDED RELEASE ORAL
Qty: 30 TABLET | Refills: 0 | Status: SHIPPED | OUTPATIENT
Start: 2019-04-10 | End: 2019-07-15 | Stop reason: SDUPTHER

## 2019-04-10 RX ORDER — OMEPRAZOLE 20 MG/1
CAPSULE, DELAYED RELEASE ORAL
Qty: 90 CAPSULE | Refills: 0 | Status: SHIPPED | OUTPATIENT
Start: 2019-04-10 | End: 2019-07-15 | Stop reason: SDUPTHER

## 2019-04-22 RX ORDER — ROSUVASTATIN CALCIUM 5 MG/1
TABLET, COATED ORAL
Qty: 30 TABLET | Refills: 2 | Status: SHIPPED | OUTPATIENT
Start: 2019-04-22 | End: 2019-05-20 | Stop reason: SDUPTHER

## 2019-04-26 DIAGNOSIS — A09 ACUTE INFECTIOUS DIARRHEA: ICD-10-CM

## 2019-04-26 RX ORDER — DICYCLOMINE HYDROCHLORIDE 10 MG/1
CAPSULE ORAL
Qty: 60 CAPSULE | Refills: 0 | Status: SHIPPED | OUTPATIENT
Start: 2019-04-26 | End: 2019-07-15 | Stop reason: SDUPTHER

## 2019-04-26 NOTE — TELEPHONE ENCOUNTER
Last visit- 9/5/18     Told to follow up- 6 months     Pending appointment- None     Additional Comments -

## 2019-05-20 RX ORDER — ROSUVASTATIN CALCIUM 5 MG/1
TABLET, COATED ORAL
Qty: 90 TABLET | Refills: 1 | Status: SHIPPED | OUTPATIENT
Start: 2019-05-20 | End: 2019-07-15 | Stop reason: SDUPTHER

## 2019-05-20 NOTE — TELEPHONE ENCOUNTER
Received a fax from [employer] stating that insurance only covers 90 days, they are asking for a new RX       Patient last seen on 9/5/18. Advised to follow up 6 months. Next appointment none.

## 2019-07-10 DIAGNOSIS — I10 ESSENTIAL HYPERTENSION: ICD-10-CM

## 2019-07-10 RX ORDER — HYDROCHLOROTHIAZIDE 25 MG/1
TABLET ORAL
Qty: 90 TABLET | Refills: 0 | OUTPATIENT
Start: 2019-07-10

## 2019-07-10 RX ORDER — LISINOPRIL 5 MG/1
TABLET ORAL
Qty: 90 TABLET | Refills: 0 | OUTPATIENT
Start: 2019-07-10

## 2019-07-10 RX ORDER — OMEPRAZOLE 20 MG/1
CAPSULE, DELAYED RELEASE ORAL
Qty: 90 CAPSULE | Refills: 0 | OUTPATIENT
Start: 2019-07-10

## 2019-07-13 DIAGNOSIS — I10 ESSENTIAL HYPERTENSION: ICD-10-CM

## 2019-07-15 ENCOUNTER — OFFICE VISIT (OUTPATIENT)
Dept: FAMILY MEDICINE CLINIC | Age: 59
End: 2019-07-15
Payer: COMMERCIAL

## 2019-07-15 DIAGNOSIS — E87.6 HYPOKALEMIA: ICD-10-CM

## 2019-07-15 DIAGNOSIS — A09 ACUTE INFECTIOUS DIARRHEA: ICD-10-CM

## 2019-07-15 DIAGNOSIS — M16.0 PRIMARY OSTEOARTHRITIS OF BOTH HIPS: ICD-10-CM

## 2019-07-15 DIAGNOSIS — I10 ESSENTIAL HYPERTENSION: Primary | ICD-10-CM

## 2019-07-15 PROCEDURE — 99214 OFFICE O/P EST MOD 30 MIN: CPT | Performed by: FAMILY MEDICINE

## 2019-07-15 RX ORDER — HYDROCODONE BITARTRATE AND ACETAMINOPHEN 5; 325 MG/1; MG/1
1 TABLET ORAL EVERY 8 HOURS PRN
COMMUNITY
End: 2019-07-15 | Stop reason: SDUPTHER

## 2019-07-15 RX ORDER — POTASSIUM CHLORIDE 20 MEQ/1
20 TABLET, EXTENDED RELEASE ORAL 2 TIMES DAILY
Qty: 180 TABLET | Refills: 1 | Status: SHIPPED | OUTPATIENT
Start: 2019-07-15 | End: 2020-01-13 | Stop reason: SDUPTHER

## 2019-07-15 RX ORDER — ROSUVASTATIN CALCIUM 5 MG/1
TABLET, COATED ORAL
Qty: 90 TABLET | Refills: 1 | Status: SHIPPED | OUTPATIENT
Start: 2019-07-15 | End: 2020-01-09

## 2019-07-15 RX ORDER — LISINOPRIL 5 MG/1
TABLET ORAL
Qty: 90 TABLET | Refills: 1 | Status: SHIPPED | OUTPATIENT
Start: 2019-07-15 | End: 2020-01-09

## 2019-07-15 RX ORDER — DICYCLOMINE HYDROCHLORIDE 10 MG/1
CAPSULE ORAL
Qty: 60 CAPSULE | Refills: 5 | Status: SHIPPED | OUTPATIENT
Start: 2019-07-15 | End: 2020-01-13 | Stop reason: SDUPTHER

## 2019-07-15 RX ORDER — HYDROCODONE BITARTRATE AND ACETAMINOPHEN 5; 325 MG/1; MG/1
1 TABLET ORAL EVERY 8 HOURS PRN
Qty: 30 TABLET | Refills: 0 | Status: SHIPPED | OUTPATIENT
Start: 2019-07-15 | End: 2019-08-15

## 2019-07-15 RX ORDER — LISINOPRIL 5 MG/1
TABLET ORAL
Qty: 90 TABLET | Refills: 0 | OUTPATIENT
Start: 2019-07-15

## 2019-07-15 RX ORDER — OMEPRAZOLE 20 MG/1
20 CAPSULE, DELAYED RELEASE ORAL DAILY
Qty: 90 CAPSULE | Refills: 1 | Status: SHIPPED | OUTPATIENT
Start: 2019-07-15 | End: 2020-01-09

## 2019-07-15 RX ORDER — GABAPENTIN 300 MG/1
CAPSULE ORAL
Qty: 270 CAPSULE | Refills: 1 | Status: CANCELLED | OUTPATIENT
Start: 2019-07-15 | End: 2021-05-24

## 2019-07-15 RX ORDER — FUROSEMIDE 20 MG/1
20 TABLET ORAL DAILY
Qty: 90 TABLET | Refills: 1 | Status: SHIPPED | OUTPATIENT
Start: 2019-07-15 | End: 2020-01-09

## 2019-07-15 RX ORDER — HYDROCHLOROTHIAZIDE 25 MG/1
TABLET ORAL
Qty: 90 TABLET | Refills: 0 | OUTPATIENT
Start: 2019-07-15

## 2019-07-15 RX ORDER — LORATADINE 10 MG/1
10 TABLET ORAL DAILY
COMMUNITY
End: 2020-01-13

## 2019-07-15 RX ORDER — LIDOCAINE 50 MG/G
1 PATCH TOPICAL DAILY
COMMUNITY
End: 2021-04-07 | Stop reason: SDUPTHER

## 2019-07-15 RX ORDER — OMEPRAZOLE 20 MG/1
CAPSULE, DELAYED RELEASE ORAL
Qty: 90 CAPSULE | Refills: 0 | OUTPATIENT
Start: 2019-07-15

## 2019-07-15 RX ORDER — GABAPENTIN 600 MG/1
600 TABLET ORAL 3 TIMES DAILY
Qty: 270 TABLET | Refills: 1 | Status: SHIPPED | OUTPATIENT
Start: 2019-07-15 | End: 2020-01-13 | Stop reason: ALTCHOICE

## 2019-07-22 VITALS
BODY MASS INDEX: 45.49 KG/M2 | DIASTOLIC BLOOD PRESSURE: 72 MMHG | OXYGEN SATURATION: 99 % | SYSTOLIC BLOOD PRESSURE: 120 MMHG | HEART RATE: 80 BPM | WEIGHT: 265 LBS

## 2019-07-22 ASSESSMENT — ENCOUNTER SYMPTOMS: RESPIRATORY NEGATIVE: 1

## 2019-10-08 ENCOUNTER — TELEPHONE (OUTPATIENT)
Dept: FAMILY MEDICINE CLINIC | Age: 59
End: 2019-10-08

## 2019-10-08 DIAGNOSIS — E78.5 HYPERLIPIDEMIA, UNSPECIFIED HYPERLIPIDEMIA TYPE: Primary | ICD-10-CM

## 2019-10-08 DIAGNOSIS — Z13.29 SCREENING FOR THYROID DISORDER: ICD-10-CM

## 2020-01-09 RX ORDER — LISINOPRIL 5 MG/1
TABLET ORAL
Qty: 90 TABLET | Refills: 0 | Status: SHIPPED | OUTPATIENT
Start: 2020-01-09 | End: 2020-01-13 | Stop reason: SDUPTHER

## 2020-01-09 RX ORDER — FUROSEMIDE 20 MG/1
TABLET ORAL
Qty: 90 TABLET | Refills: 0 | Status: SHIPPED | OUTPATIENT
Start: 2020-01-09 | End: 2020-01-13 | Stop reason: SDUPTHER

## 2020-01-09 RX ORDER — ROSUVASTATIN CALCIUM 5 MG/1
TABLET, COATED ORAL
Qty: 11 TABLET | Refills: 0 | Status: SHIPPED | OUTPATIENT
Start: 2020-01-09 | End: 2020-01-13 | Stop reason: CLARIF

## 2020-01-09 RX ORDER — OMEPRAZOLE 20 MG/1
CAPSULE, DELAYED RELEASE ORAL
Qty: 90 CAPSULE | Refills: 0 | Status: SHIPPED | OUTPATIENT
Start: 2020-01-09 | End: 2020-01-13 | Stop reason: SDUPTHER

## 2020-01-13 ENCOUNTER — OFFICE VISIT (OUTPATIENT)
Dept: FAMILY MEDICINE CLINIC | Age: 60
End: 2020-01-13
Payer: COMMERCIAL

## 2020-01-13 VITALS
HEART RATE: 76 BPM | BODY MASS INDEX: 39.31 KG/M2 | SYSTOLIC BLOOD PRESSURE: 130 MMHG | WEIGHT: 229 LBS | OXYGEN SATURATION: 98 % | DIASTOLIC BLOOD PRESSURE: 82 MMHG

## 2020-01-13 PROCEDURE — 99215 OFFICE O/P EST HI 40 MIN: CPT | Performed by: FAMILY MEDICINE

## 2020-01-13 RX ORDER — GABAPENTIN 300 MG/1
CAPSULE ORAL
Qty: 270 CAPSULE | Refills: 1 | Status: SHIPPED | OUTPATIENT
Start: 2020-01-13 | End: 2020-02-17 | Stop reason: SDUPTHER

## 2020-01-13 RX ORDER — ATORVASTATIN CALCIUM 20 MG/1
20 TABLET, FILM COATED ORAL DAILY
Qty: 90 TABLET | Refills: 3 | Status: SHIPPED | OUTPATIENT
Start: 2020-01-13 | End: 2020-02-17 | Stop reason: SDUPTHER

## 2020-01-13 RX ORDER — FUROSEMIDE 20 MG/1
TABLET ORAL
Qty: 90 TABLET | Refills: 1 | Status: SHIPPED | OUTPATIENT
Start: 2020-01-13 | End: 2020-02-17 | Stop reason: SDUPTHER

## 2020-01-13 RX ORDER — OMEPRAZOLE 20 MG/1
CAPSULE, DELAYED RELEASE ORAL
Qty: 90 CAPSULE | Refills: 1 | Status: SHIPPED | OUTPATIENT
Start: 2020-01-13 | End: 2020-02-17 | Stop reason: SDUPTHER

## 2020-01-13 RX ORDER — POTASSIUM CHLORIDE 20 MEQ/1
20 TABLET, EXTENDED RELEASE ORAL 2 TIMES DAILY
Qty: 180 TABLET | Refills: 1 | Status: SHIPPED | OUTPATIENT
Start: 2020-01-13 | End: 2020-02-17 | Stop reason: SDUPTHER

## 2020-01-13 RX ORDER — LISINOPRIL 5 MG/1
TABLET ORAL
Qty: 90 TABLET | Refills: 1 | Status: SHIPPED | OUTPATIENT
Start: 2020-01-13 | End: 2020-02-17 | Stop reason: SDUPTHER

## 2020-01-13 RX ORDER — DICYCLOMINE HYDROCHLORIDE 10 MG/1
CAPSULE ORAL
Qty: 60 CAPSULE | Refills: 5 | Status: SHIPPED | OUTPATIENT
Start: 2020-01-13 | End: 2020-10-12 | Stop reason: ALTCHOICE

## 2020-01-13 ASSESSMENT — ENCOUNTER SYMPTOMS
GASTROINTESTINAL NEGATIVE: 1
RESPIRATORY NEGATIVE: 1

## 2020-01-13 NOTE — PROGRESS NOTES
lower leg: Edema (3+ pretibial edema with palpable cord medial aspect of calf, erythema, petechiae) present. Lymphadenopathy:      Cervical: No cervical adenopathy. Skin:     General: Skin is warm. Comments: Left lower extremity: 3+ pitting edema below the knee. Scar healing well. Petechiae noted in the lower part of the leg as result of this increased edema. Some erythema as well. Some scaling on the feet. No hives seen at the current time   Neurological:      General: No focal deficit present. Mental Status: She is alert. Psychiatric:         Mood and Affect: Mood normal.       /82   Pulse 76   Wt 229 lb (103.9 kg)   LMP 07/04/2013   SpO2 98%   BMI 39.31 kg/m²   >40 minutes with patient, all one on one or with accompanying family member re illness, possible etiologies,w/u and treatment with greater than 50% of time in counseling for: Recurrent possible acute condition and also her chronic conditions and her self-medication or changing her medicines    Assessment:      Keila Chapman was seen today for hypertension, hyperlipidemia, gastroesophageal reflux, pain and leg swelling. Diagnoses and all orders for this visit:    Acute deep vein thrombosis (DVT) of popliteal vein of left lower extremity (HCC)    Essential hypertension  -     lisinopril (PRINIVIL;ZESTRIL) 5 MG tablet; TAKE ONE TABLET BY MOUTH DAILY    Primary osteoarthritis of both hips  -     gabapentin (NEURONTIN) 300 MG capsule; TAKE ONE CAPSULE BY MOUTH THREE TIMES A DAY  -     furosemide (LASIX) 20 MG tablet; TAKE ONE TABLET BY MOUTH DAILY    Acute infectious diarrhea  -     dicyclomine (BENTYL) 10 MG capsule; TAKE ONE CAPSULE BY MOUTH FOUR TIMES A DAY FOR 15 DAYS    Hyperlipidemia, unspecified hyperlipidemia type    Gastroesophageal reflux disease, esophagitis presence not specified    Other orders  -     potassium chloride (KLOR-CON M) 20 MEQ extended release tablet;  Take 1 tablet by mouth 2 times daily  -     omeprazole (PRILOSEC) 20 MG delayed release capsule; TAKE ONE CAPSULE BY MOUTH DAILY  -     atorvastatin (LIPITOR) 20 MG tablet; Take 1 tablet by mouth daily           Plan:      Patient Instructions   No aspirin until clear re left leg    You want to call [facility]. Notify me of result    After evaluating and treating patient informing that she gets everything for free at [facility] related to her surgery. I did inform her I was worried about a DVT in that left lower extremity and that she should call them and see what they can do. She was also given a couple days worth of Eliquis at 10 mg twice a day and is to stop the aspirin while doing this. She is to call me if they do not wish to evaluate her leg. And I will. Patient understands anticoagulation bleeding blood clots etc.  Also she wants Celebrex 200 twice a day currently taking it. Told to try Celebrex 200 once a day and if that works will prescribe it in that manner. Also is to take the Celebrex with food. Return in about 6 months (around 7/13/2020).           Asim Morfin MA

## 2020-01-13 NOTE — PATIENT INSTRUCTIONS
No aspirin until clear re left leg    You want to call [facility]. Notify me of result    After evaluating and treating patient informing that she gets everything for free at [facility] related to her surgery. I did inform her I was worried about a DVT in that left lower extremity and that she should call them and see what they can do. She was also given a couple days worth of Eliquis at 10 mg twice a day and is to stop the aspirin while doing this. She is to call me if they do not wish to evaluate her leg. And I will. Patient understands anticoagulation bleeding blood clots etc.  Also she wants Celebrex 200 twice a day currently taking it. Told to try Celebrex 200 once a day and if that works will prescribe it in that manner. Also is to take the Celebrex with food.

## 2020-02-17 RX ORDER — POTASSIUM CHLORIDE 20 MEQ/1
20 TABLET, EXTENDED RELEASE ORAL 2 TIMES DAILY
Qty: 180 TABLET | Refills: 1 | Status: SHIPPED | OUTPATIENT
Start: 2020-02-17 | End: 2021-04-07

## 2020-02-17 RX ORDER — ATORVASTATIN CALCIUM 20 MG/1
20 TABLET, FILM COATED ORAL DAILY
Qty: 90 TABLET | Refills: 1 | Status: SHIPPED | OUTPATIENT
Start: 2020-02-17 | End: 2020-07-31 | Stop reason: SDUPTHER

## 2020-02-17 RX ORDER — GABAPENTIN 300 MG/1
CAPSULE ORAL
Qty: 270 CAPSULE | Refills: 1 | Status: SHIPPED | OUTPATIENT
Start: 2020-02-17 | End: 2020-07-31 | Stop reason: SDUPTHER

## 2020-02-17 RX ORDER — LISINOPRIL 5 MG/1
TABLET ORAL
Qty: 90 TABLET | Refills: 1 | Status: SHIPPED | OUTPATIENT
Start: 2020-02-17 | End: 2020-07-31 | Stop reason: SDUPTHER

## 2020-02-17 RX ORDER — FUROSEMIDE 20 MG/1
TABLET ORAL
Qty: 90 TABLET | Refills: 1 | Status: SHIPPED | OUTPATIENT
Start: 2020-02-17 | End: 2020-07-31 | Stop reason: SDUPTHER

## 2020-02-17 RX ORDER — OMEPRAZOLE 20 MG/1
CAPSULE, DELAYED RELEASE ORAL
Qty: 90 CAPSULE | Refills: 1 | Status: SHIPPED | OUTPATIENT
Start: 2020-02-17 | End: 2020-07-31 | Stop reason: SDUPTHER

## 2020-02-28 ENCOUNTER — TELEPHONE (OUTPATIENT)
Dept: FAMILY MEDICINE CLINIC | Age: 60
End: 2020-02-28

## 2020-02-28 NOTE — TELEPHONE ENCOUNTER
Patient was seen in the office on 1-13-20 and her  was seen on 1-10-20. She is asking why her bill was higher than her  when she did not have any blood work done.

## 2020-03-17 RX ORDER — CELECOXIB 200 MG/1
200 CAPSULE ORAL 2 TIMES DAILY
Qty: 180 CAPSULE | Refills: 1 | Status: SHIPPED | OUTPATIENT
Start: 2020-03-17 | End: 2020-07-31 | Stop reason: SDUPTHER

## 2020-03-17 NOTE — TELEPHONE ENCOUNTER
Does she want a prescription for Celebrex once a day or twice a day and does she get it 90 days at a time with a refill or for 30 days at a time with 5 refills?

## 2020-07-14 ENCOUNTER — TELEPHONE (OUTPATIENT)
Dept: FAMILY MEDICINE CLINIC | Age: 60
End: 2020-07-14

## 2020-07-14 NOTE — TELEPHONE ENCOUNTER
Patient cancelled New patient appt. Patient was having symptoms of covid and will be Covid tested first.  Will call to reschedule new patient appt.

## 2020-07-15 ENCOUNTER — OFFICE VISIT (OUTPATIENT)
Dept: PRIMARY CARE CLINIC | Age: 60
End: 2020-07-15
Payer: COMMERCIAL

## 2020-07-15 PROCEDURE — 99211 OFF/OP EST MAY X REQ PHY/QHP: CPT | Performed by: NURSE PRACTITIONER

## 2020-07-15 NOTE — PROGRESS NOTES
Jessy Ervin received a viral test for COVID-19. They were educated on isolation and quarantine as appropriate. For any symptoms, they were directed to seek care from their PCP, given contact information to establish with a doctor, directed to an urgent care or the emergency room.

## 2020-07-20 LAB
SARS-COV-2: DETECTED
SOURCE: ABNORMAL

## 2020-07-30 NOTE — TELEPHONE ENCOUNTER
Requesting a refill of Celebrex, omeprazole, atorvastatin, furosemide, lisinopril, and gabapentin. [facility]  Scheduled an appointment to see Colby Hurtado on 9-17-20

## 2020-07-30 NOTE — TELEPHONE ENCOUNTER
Patient wants to check on pricing so she does not want me to send the request yet   She will call me back and let me know so I will leave this in here till I speak to her

## 2020-07-31 NOTE — TELEPHONE ENCOUNTER
Patient wants the gabapentin and celebrex to go to [facility] and the others to go to [address]  I sent this request to Dr Rivas Leader

## 2020-08-03 RX ORDER — ATORVASTATIN CALCIUM 20 MG/1
20 TABLET, FILM COATED ORAL DAILY
Qty: 90 TABLET | Refills: 0 | Status: SHIPPED | OUTPATIENT
Start: 2020-08-03 | End: 2020-10-12 | Stop reason: SINTOL

## 2020-08-03 RX ORDER — CELECOXIB 200 MG/1
200 CAPSULE ORAL 2 TIMES DAILY
Qty: 180 CAPSULE | Refills: 0 | Status: SHIPPED | OUTPATIENT
Start: 2020-08-03 | End: 2021-04-07 | Stop reason: SDUPTHER

## 2020-08-03 RX ORDER — GABAPENTIN 300 MG/1
CAPSULE ORAL
Qty: 270 CAPSULE | Refills: 0 | Status: SHIPPED | OUTPATIENT
Start: 2020-08-03 | End: 2021-04-07

## 2020-08-03 RX ORDER — LISINOPRIL 5 MG/1
TABLET ORAL
Qty: 90 TABLET | Refills: 0 | Status: SHIPPED | OUTPATIENT
Start: 2020-08-03 | End: 2020-10-22 | Stop reason: SDUPTHER

## 2020-08-03 RX ORDER — FUROSEMIDE 20 MG/1
TABLET ORAL
Qty: 90 TABLET | Refills: 0 | Status: SHIPPED | OUTPATIENT
Start: 2020-08-03 | End: 2021-03-24

## 2020-08-03 RX ORDER — OMEPRAZOLE 20 MG/1
CAPSULE, DELAYED RELEASE ORAL
Qty: 90 CAPSULE | Refills: 0 | Status: SHIPPED | OUTPATIENT
Start: 2020-08-03 | End: 2020-10-27

## 2020-09-14 ENCOUNTER — TELEPHONE (OUTPATIENT)
Dept: FAMILY MEDICINE CLINIC | Age: 60
End: 2020-09-14

## 2020-09-14 NOTE — TELEPHONE ENCOUNTER
----- Message from Alejandro Jenkins RN sent at 9/14/2020  2:30 PM EDT -----  Subject: Appointment Request    Reason for Call: New Patient Request Appointment    QUESTIONS  Type of Appointment? New Patient/New to Provider  Reason for appointment request? No appointments available during search  Additional Information for Provider? Pt called in to reschedule her appt   on 9/17 but there were no available appts were listed. Pt is requesting to   be seen the week of the 28th. Please advise   ---------------------------------------------------------------------------  --------------  CALL BACK INFO  What is the best way for the office to contact you? OK to leave message on   voicemail  Preferred Call Back Phone Number? [phone]  ---------------------------------------------------------------------------  --------------  SCRIPT ANSWERS  Relationship to Patient? Self  Have your symptoms changed? No  Appointment reason? Establish Care/Find a provider  Have you been diagnosed with   tested for   or told that you are suspected of having COVID-19 (Coronavirus)? No  Have you had a fever or taken medication to treat a fever within the past   3 days? No  Have you had a cough   shortness of breath or flu-like symptoms within the past 3 days? No  Do you currently have flu-like symptoms including fever or chills   cough   shortness of breath   or difficulty breathing   or new loss of taste or smell?  Yes

## 2020-09-14 NOTE — TELEPHONE ENCOUNTER
Called patient to reschedule her appointment. No answer, left message to call.  There are no appointments available until october

## 2020-10-12 ENCOUNTER — OFFICE VISIT (OUTPATIENT)
Dept: FAMILY MEDICINE CLINIC | Age: 60
End: 2020-10-12
Payer: COMMERCIAL

## 2020-10-12 VITALS
OXYGEN SATURATION: 99 % | BODY MASS INDEX: 38.79 KG/M2 | TEMPERATURE: 96.6 F | DIASTOLIC BLOOD PRESSURE: 86 MMHG | HEART RATE: 66 BPM | SYSTOLIC BLOOD PRESSURE: 138 MMHG | WEIGHT: 226 LBS

## 2020-10-12 PROCEDURE — 99213 OFFICE O/P EST LOW 20 MIN: CPT | Performed by: NURSE PRACTITIONER

## 2020-10-12 PROCEDURE — 90471 IMMUNIZATION ADMIN: CPT | Performed by: NURSE PRACTITIONER

## 2020-10-12 PROCEDURE — 90688 IIV4 VACCINE SPLT 0.5 ML IM: CPT | Performed by: NURSE PRACTITIONER

## 2020-10-12 RX ORDER — PHENTERMINE HYDROCHLORIDE 37.5 MG/1
37.5 TABLET ORAL 2 TIMES DAILY
COMMUNITY
End: 2021-04-07 | Stop reason: CLARIF

## 2020-10-12 RX ORDER — HYDROCODONE BITARTRATE AND ACETAMINOPHEN 5; 325 MG/1; MG/1
1 TABLET ORAL EVERY 6 HOURS PRN
COMMUNITY
End: 2021-10-11 | Stop reason: SDUPTHER

## 2020-10-12 ASSESSMENT — ENCOUNTER SYMPTOMS
CHEST TIGHTNESS: 0
DIARRHEA: 0
BLOOD IN STOOL: 0
COUGH: 0
SHORTNESS OF BREATH: 0
CONSTIPATION: 0

## 2020-10-12 NOTE — PROGRESS NOTES
TempSrc: Infrared   SpO2: 99%   Weight: 226 lb (102.5 kg)     Estimated body mass index is 38.79 kg/m² as calculated from the following:    Height as of 11/6/18: 5' 4\" (1.626 m). Weight as of this encounter: 226 lb (102.5 kg). Physical Exam  Vitals signs reviewed. Constitutional:       Appearance: Normal appearance. HENT:      Head: Normocephalic. Neck:      Vascular: No carotid bruit. Cardiovascular:      Rate and Rhythm: Normal rate and regular rhythm. Pulses: Normal pulses. Heart sounds: Normal heart sounds. No murmur. No gallop. Pulmonary:      Effort: Pulmonary effort is normal.      Breath sounds: Normal breath sounds. Abdominal:      General: Abdomen is flat. Palpations: Abdomen is soft. Musculoskeletal: Normal range of motion. Skin:     General: Skin is warm and dry. Capillary Refill: Capillary refill takes less than 2 seconds. Neurological:      General: No focal deficit present. Mental Status: She is alert and oriented to person, place, and time. Psychiatric:         Mood and Affect: Mood normal.         Behavior: Behavior normal.         ASSESSMENT/PLAN:  1. Essential hypertension  Stable, well controlled today  Continue Lisinopril today  - CBC; Future    2. Alopecia  Labs due next visit  Will stop Lipitor as this is an atypical SE. Due to prior Lipid results, will not restart statin until after obtaining new labs. Needs TSH with labs    3. Hyperlipidemia, unspecified hyperlipidemia type  See #2  - Comprehensive Metabolic Panel; Future  - Lipid Panel; Future    4. Primary osteoarthritis of both hips  Stable, continue Celebrex and Gabapetin    5. Class 2 obesity due to excess calories without serious comorbidity with body mass index (BMI) of 38.0 to 38.9 in adult  Diabetes screening today  - Hemoglobin A1C; Future    6.  Screening mammogram, encounter for  Mammogram screening today, last done 2011  - MARSHAL DIGITAL SCREEN W OR WO CAD BILATERAL; Future    7. Need for influenza vaccination  Influenza vaccination today. - INFLUENZA, QUADV, 3 YRS AND OLDER, IM, MDV, 0.5ML (Juan Fitch)      Return in about 3 months (around 1/12/2021) for HLD. Patient's questions answered and concerns addressed. Patient agrees to plan of care.           Electronically signed by ALKA Cotto CNP on 10/12/2020 at 3:31 PM

## 2020-10-12 NOTE — PROGRESS NOTES
Vaccine Information Sheet, \"Influenza - Inactivated\"  given to Manuel Melendez, or parent/legal guardian of  Manuel Melendez and verbalized understanding. Patient responses:    Have you ever had a reaction to a flu vaccine? No  Do you have any current illness? No  Have you ever had Guillian [place] Syndrome? No  Do you have a serious allergy to any of the follow: Neomycin, Polymyxin, Thimerosal, eggs or egg products? No    Flu vaccine given per order. Please see immunization tab. Risks and benefits explained. Current VIS given.       Immunizations Administered     Name Date Dose Route    Influenza, Quadv, IM, (6 mo and older Fluzone, Flulaval, Fluarix and 3 yrs and older Afluria) 10/12/2020 0.5 mL Intramuscular    Site: Deltoid- Right    Lot: [id number]    NDC: 91025-029-29

## 2020-10-21 NOTE — TELEPHONE ENCOUNTER
Future appt scheduled 01/11/2021           Last appt 10/12/2020      Last Written 08/03/2020    lisinopril (PRINIVIL;ZESTRIL) 5 MG tablet   #90  0 RF

## 2020-10-22 RX ORDER — LISINOPRIL 5 MG/1
TABLET ORAL
Qty: 90 TABLET | Refills: 3 | Status: SHIPPED | OUTPATIENT
Start: 2020-10-22 | End: 2021-04-07 | Stop reason: SDUPTHER

## 2020-10-27 RX ORDER — OMEPRAZOLE 20 MG/1
CAPSULE, DELAYED RELEASE ORAL
Qty: 90 CAPSULE | Refills: 1 | Status: SHIPPED | OUTPATIENT
Start: 2020-10-27 | End: 2021-04-26

## 2020-10-30 ENCOUNTER — OFFICE VISIT (OUTPATIENT)
Dept: FAMILY MEDICINE CLINIC | Age: 60
End: 2020-10-30
Payer: COMMERCIAL

## 2020-10-30 ENCOUNTER — TELEPHONE (OUTPATIENT)
Dept: FAMILY MEDICINE CLINIC | Age: 60
End: 2020-10-30

## 2020-10-30 VITALS
OXYGEN SATURATION: 98 % | SYSTOLIC BLOOD PRESSURE: 144 MMHG | WEIGHT: 226 LBS | BODY MASS INDEX: 38.79 KG/M2 | DIASTOLIC BLOOD PRESSURE: 88 MMHG | HEART RATE: 81 BPM | TEMPERATURE: 97.5 F

## 2020-10-30 PROCEDURE — 99213 OFFICE O/P EST LOW 20 MIN: CPT | Performed by: NURSE PRACTITIONER

## 2020-10-30 RX ORDER — FLUCONAZOLE 150 MG/1
150 TABLET ORAL ONCE
Qty: 2 TABLET | Refills: 0 | Status: SHIPPED | OUTPATIENT
Start: 2020-10-30 | End: 2020-10-30

## 2020-10-30 RX ORDER — CEPHALEXIN 500 MG/1
500 CAPSULE ORAL 2 TIMES DAILY
Qty: 20 CAPSULE | Refills: 0 | Status: SHIPPED | OUTPATIENT
Start: 2020-10-30 | End: 2020-11-09

## 2020-10-30 ASSESSMENT — ENCOUNTER SYMPTOMS
SINUS PRESSURE: 0
VOICE CHANGE: 0
RESPIRATORY NEGATIVE: 1
SINUS PAIN: 0
SORE THROAT: 0
RHINORRHEA: 0
TROUBLE SWALLOWING: 0
FACIAL SWELLING: 0

## 2020-10-30 ASSESSMENT — PATIENT HEALTH QUESTIONNAIRE - PHQ9
SUM OF ALL RESPONSES TO PHQ QUESTIONS 1-9: 0
SUM OF ALL RESPONSES TO PHQ9 QUESTIONS 1 & 2: 0
SUM OF ALL RESPONSES TO PHQ QUESTIONS 1-9: 0
1. LITTLE INTEREST OR PLEASURE IN DOING THINGS: 0
SUM OF ALL RESPONSES TO PHQ QUESTIONS 1-9: 0
2. FEELING DOWN, DEPRESSED OR HOPELESS: 0

## 2020-10-30 NOTE — PROGRESS NOTES
10/30/2020    Chief Complaint   Patient presents with    Mass     on the right side of her neck, it is not hard and does not really hurt unless something is pressing on it        Curt Moreno is a 60 y.o. female, presents today for:    HPI   Concerned about mass on right side of neck. Increasingly concerned today because she felt like it moved further towards her chest today. Present x 2 weeks. Initially thought due to Rogaine use for her hair so she has not stopped. Not inflamed. Only painful when object like her purse presses on it. No similar lumps on her body. No prior inflmaed lesions, skin cancer. No recent URIs, lung infection. NO fever/ sweats. NO SOB/ CP/ dizziness. No dysphagia/ globus sensation in throat, voice changes. No prior hx hypothyroid. Prior smoking history. Review of Systems   Constitutional: Negative for activity change, appetite change, chills, diaphoresis, fatigue and fever. HENT: Negative for congestion, dental problem, drooling, ear discharge, ear pain, facial swelling, hearing loss, mouth sores, nosebleeds, postnasal drip, rhinorrhea, sinus pressure, sinus pain, sneezing, sore throat, tinnitus, trouble swallowing and voice change. Respiratory: Negative. Cardiovascular: Negative. Musculoskeletal: Negative. Neurological: Negative. Hematological: Negative. Current Outpatient Medications on File Prior to Visit   Medication Sig Dispense Refill    omeprazole (PRILOSEC) 20 MG delayed release capsule TAKE 1 CAPSULE DAILY 90 capsule 1    lisinopril (PRINIVIL;ZESTRIL) 5 MG tablet TAKE 1 TABLET DAILY 90 tablet 3    phentermine (ADIPEX-P) 37.5 MG tablet Take 37.5 mg by mouth 2 times daily.  HYDROcodone-acetaminophen (NORCO) 5-325 MG per tablet Take 1 tablet by mouth every 6 hours as needed for Pain.       celecoxib (CELEBREX) 200 MG capsule Take 1 capsule by mouth 2 times daily (Patient taking differently: Take 200 mg by mouth daily ) 180 capsule 0    furosemide (LASIX) 20 MG tablet TAKE ONE TABLET BY MOUTH DAILY 90 tablet 0    gabapentin (NEURONTIN) 300 MG capsule TAKE ONE CAPSULE BY MOUTH THREE TIMES A  capsule 0    Biotin 67910 MCG TABS Take by mouth      diclofenac (PENNSAID) 2 % SOLN Apply two pumps twice daily 112 g 1    potassium chloride (KLOR-CON M) 20 MEQ extended release tablet Take 1 tablet by mouth 2 times daily 180 tablet 1    lidocaine (LIDODERM) 5 % Place 1 patch onto the skin daily 12 hours on, 12 hours off. No current facility-administered medications on file prior to visit. Allergies   Allergen Reactions    Levaquin [Levofloxacin In D5w] Other (See Comments)     Bladder spasm    Penicillins     Terbinafine Hcl     Bactrim Rash    Lamisil [Terbinafine] Rash     Past Medical History:   Diagnosis Date    COVID-19     Hypertension     Unspecified sleep apnea      Past Surgical History:   Procedure Laterality Date     SECTION      COLONOSCOPY      POLYP    COLONOSCOPY  2017    polyp    CYST REMOVAL      TONSILLECTOMY AND ADENOIDECTOMY      TUBAL LIGATION        Social History     Tobacco Use    Smoking status: Former Smoker     Packs/day: 1.00     Years: 25.00     Pack years: 25.00     Last attempt to quit: 2003     Years since quittin.3    Smokeless tobacco: Never Used   Substance Use Topics    Alcohol use: Yes     Alcohol/week: 0.0 standard drinks     Comment: occasionally      No family history on file. Vitals:    10/30/20 1528   BP: (!) 144/88   Pulse: 81   Temp: 97.5 °F (36.4 °C)   TempSrc: Infrared   SpO2: 98%   Weight: 226 lb (102.5 kg)     Estimated body mass index is 38.79 kg/m² as calculated from the following:    Height as of 18: 5' 4\" (1.626 m). Weight as of this encounter: 226 lb (102.5 kg). Physical Exam  Constitutional:       Appearance: Normal appearance. HENT:      Head: Normocephalic.       Right Ear: Tympanic membrane normal.      Left Ear: Tympanic membrane normal.      Nose: Nose normal.      Mouth/Throat:      Mouth: Mucous membranes are moist.   Neck:      Musculoskeletal: Normal range of motion. Comments: 3x4 soft, mobile mass right neck, feels like lipoma  Neurological:      Mental Status: She is alert. ASSESSMENT/PLAN:  1. Subcutaneous nodule of neck  Trial Keflex  If no improvement consider U/S neck nodule OR referral to [facility] for removal.    - cephALEXin (KEFLEX) 500 MG capsule; Take 1 capsule by mouth 2 times daily for 10 days  Dispense: 20 capsule; Refill: 0      Return if symptoms worsen or fail to improve. Patient's questions answered and concerns addressed. Patient agrees to plan of care.           Electronically signed by ALKA Golden CNP on 10/31/2020 at 8:24 PM

## 2020-11-17 ENCOUNTER — TELEPHONE (OUTPATIENT)
Dept: FAMILY MEDICINE CLINIC | Age: 60
End: 2020-11-17

## 2020-11-17 NOTE — TELEPHONE ENCOUNTER
Patient was seen on 10/30/20 for a mass on the right side of her neck. States she was given antibiotics to see if it would decrease in size or go away. States that it is exactly the same. Only painful when something it pushing on it. Says that when in for her visit you had mentioned sending her for an ultrasound? But states that she is open to seeing a dermatologist if you think it is more their area. Please advise.

## 2020-11-20 ENCOUNTER — HOSPITAL ENCOUNTER (OUTPATIENT)
Dept: ULTRASOUND IMAGING | Age: 60
Discharge: HOME | End: 2020-11-20
Payer: COMMERCIAL

## 2020-11-20 PROCEDURE — 76536 US EXAM OF HEAD AND NECK: CPT

## 2020-11-24 ENCOUNTER — NURSE ONLY (OUTPATIENT)
Dept: FAMILY MEDICINE CLINIC | Age: 60
End: 2020-11-24
Payer: COMMERCIAL

## 2020-11-24 ENCOUNTER — TELEPHONE (OUTPATIENT)
Dept: FAMILY MEDICINE CLINIC | Age: 60
End: 2020-11-24

## 2020-11-24 LAB
A/G RATIO: 1.7 (ref 1.1–2.2)
ALBUMIN SERPL-MCNC: 4.5 G/DL (ref 3.4–5)
ALP BLD-CCNC: 74 U/L (ref 40–129)
ALT SERPL-CCNC: 19 U/L (ref 10–40)
ANION GAP SERPL CALCULATED.3IONS-SCNC: 10 MMOL/L (ref 3–16)
AST SERPL-CCNC: 18 U/L (ref 15–37)
BILIRUB SERPL-MCNC: 0.3 MG/DL (ref 0–1)
BUN BLDV-MCNC: 17 MG/DL (ref 7–20)
CALCIUM SERPL-MCNC: 9.8 MG/DL (ref 8.3–10.6)
CHLORIDE BLD-SCNC: 104 MMOL/L (ref 99–110)
CHOLESTEROL, TOTAL: 234 MG/DL (ref 0–199)
CO2: 26 MMOL/L (ref 21–32)
CREAT SERPL-MCNC: 0.6 MG/DL (ref 0.6–1.2)
GFR AFRICAN AMERICAN: >60
GFR NON-AFRICAN AMERICAN: >60
GLOBULIN: 2.7 G/DL
GLUCOSE BLD-MCNC: 98 MG/DL (ref 70–99)
HCT VFR BLD CALC: 43.6 % (ref 36–48)
HDLC SERPL-MCNC: 55 MG/DL (ref 40–60)
HEMOGLOBIN: 14.6 G/DL (ref 12–16)
LDL CHOLESTEROL CALCULATED: 160 MG/DL
MCH RBC QN AUTO: 31.4 PG (ref 26–34)
MCHC RBC AUTO-ENTMCNC: 33.4 G/DL (ref 31–36)
MCV RBC AUTO: 94.2 FL (ref 80–100)
PDW BLD-RTO: 13.4 % (ref 12.4–15.4)
PLATELET # BLD: 184 K/UL (ref 135–450)
PMV BLD AUTO: 8.6 FL (ref 5–10.5)
POTASSIUM SERPL-SCNC: 4.3 MMOL/L (ref 3.5–5.1)
RBC # BLD: 4.63 M/UL (ref 4–5.2)
SODIUM BLD-SCNC: 140 MMOL/L (ref 136–145)
T4 FREE: 1.2 NG/DL (ref 0.9–1.8)
TOTAL PROTEIN: 7.2 G/DL (ref 6.4–8.2)
TRIGL SERPL-MCNC: 94 MG/DL (ref 0–150)
TSH REFLEX: 1.84 UIU/ML (ref 0.27–4.2)
VLDLC SERPL CALC-MCNC: 19 MG/DL
WBC # BLD: 3.8 K/UL (ref 4–11)

## 2020-11-24 PROCEDURE — 36415 COLL VENOUS BLD VENIPUNCTURE: CPT | Performed by: NURSE PRACTITIONER

## 2020-11-25 LAB
ESTIMATED AVERAGE GLUCOSE: 99.7 MG/DL
HBA1C MFR BLD: 5.1 %

## 2021-03-23 DIAGNOSIS — M16.0 PRIMARY OSTEOARTHRITIS OF BOTH HIPS: ICD-10-CM

## 2021-03-23 NOTE — TELEPHONE ENCOUNTER
Last seen 10/30/20, was told to fu in 3 mo. I called patient and she said she thought we had cancelled that appt, because she didn't think she was to fu till 6 months. I told her it looks like she was supposed to do 3 months so I put her on for the first available which is on 4/7.   But she is almost out of this so she needs it refilled now

## 2021-03-24 RX ORDER — FUROSEMIDE 20 MG/1
TABLET ORAL
Qty: 90 TABLET | Refills: 0 | Status: SHIPPED | OUTPATIENT
Start: 2021-03-24 | End: 2021-06-28 | Stop reason: SDUPTHER

## 2021-04-02 ENCOUNTER — HOSPITAL ENCOUNTER (OUTPATIENT)
Dept: WOMENS IMAGING | Age: 61
Discharge: HOME | End: 2021-04-02
Payer: COMMERCIAL

## 2021-04-02 DIAGNOSIS — Z12.31 SCREENING MAMMOGRAM, ENCOUNTER FOR: ICD-10-CM

## 2021-04-02 PROCEDURE — 77067 SCR MAMMO BI INCL CAD: CPT

## 2021-04-05 NOTE — RESULT ENCOUNTER NOTE
Normal Mammogram.  Due again in 1 year. Please call if you have additional questions and/ or concerns. Please keep your next appt. Thank you.

## 2021-04-07 ENCOUNTER — OFFICE VISIT (OUTPATIENT)
Dept: FAMILY MEDICINE CLINIC | Age: 61
End: 2021-04-07
Payer: COMMERCIAL

## 2021-04-07 VITALS
TEMPERATURE: 98.1 F | DIASTOLIC BLOOD PRESSURE: 88 MMHG | WEIGHT: 229 LBS | HEART RATE: 80 BPM | OXYGEN SATURATION: 99 % | BODY MASS INDEX: 39.31 KG/M2 | SYSTOLIC BLOOD PRESSURE: 122 MMHG

## 2021-04-07 DIAGNOSIS — E78.5 HYPERLIPIDEMIA, UNSPECIFIED HYPERLIPIDEMIA TYPE: ICD-10-CM

## 2021-04-07 DIAGNOSIS — M16.0 PRIMARY OSTEOARTHRITIS OF BOTH HIPS: ICD-10-CM

## 2021-04-07 DIAGNOSIS — E66.09 CLASS 2 OBESITY DUE TO EXCESS CALORIES WITHOUT SERIOUS COMORBIDITY WITH BODY MASS INDEX (BMI) OF 38.0 TO 38.9 IN ADULT: ICD-10-CM

## 2021-04-07 DIAGNOSIS — I10 ESSENTIAL HYPERTENSION: Primary | ICD-10-CM

## 2021-04-07 DIAGNOSIS — L65.9 ALOPECIA: ICD-10-CM

## 2021-04-07 PROBLEM — E66.812 CLASS 2 OBESITY DUE TO EXCESS CALORIES WITHOUT SERIOUS COMORBIDITY WITH BODY MASS INDEX (BMI) OF 38.0 TO 38.9 IN ADULT: Status: ACTIVE | Noted: 2021-04-07

## 2021-04-07 PROCEDURE — 99214 OFFICE O/P EST MOD 30 MIN: CPT | Performed by: NURSE PRACTITIONER

## 2021-04-07 RX ORDER — CELECOXIB 200 MG/1
200 CAPSULE ORAL DAILY
Qty: 90 CAPSULE | Refills: 1 | Status: SHIPPED | OUTPATIENT
Start: 2021-04-07 | End: 2021-04-12 | Stop reason: SDUPTHER

## 2021-04-07 RX ORDER — OMEGA-3S/DHA/EPA/FISH OIL 1000-1400
1 CAPSULE,DELAYED RELEASE (ENTERIC COATED) ORAL DAILY
COMMUNITY
End: 2022-04-01 | Stop reason: CLARIF

## 2021-04-07 RX ORDER — LISINOPRIL 5 MG/1
TABLET ORAL
Qty: 90 TABLET | Refills: 1 | Status: SHIPPED | OUTPATIENT
Start: 2021-04-07 | End: 2021-04-12 | Stop reason: SDUPTHER

## 2021-04-07 RX ORDER — GABAPENTIN 300 MG/1
300 CAPSULE ORAL DAILY
Qty: 90 CAPSULE | Refills: 1
Start: 2021-04-07 | End: 2021-09-27 | Stop reason: SDUPTHER

## 2021-04-07 RX ORDER — LIDOCAINE 50 MG/G
1 PATCH TOPICAL DAILY
Qty: 30 PATCH | Refills: 2 | Status: SHIPPED | OUTPATIENT
Start: 2021-04-07 | End: 2021-04-12

## 2021-04-07 ASSESSMENT — ENCOUNTER SYMPTOMS
CHEST TIGHTNESS: 0
DIARRHEA: 0
BLOOD IN STOOL: 0
COUGH: 0
SHORTNESS OF BREATH: 0
CONSTIPATION: 0
BACK PAIN: 1

## 2021-04-07 ASSESSMENT — PATIENT HEALTH QUESTIONNAIRE - PHQ9
SUM OF ALL RESPONSES TO PHQ QUESTIONS 1-9: 1
1. LITTLE INTEREST OR PLEASURE IN DOING THINGS: 0
SUM OF ALL RESPONSES TO PHQ QUESTIONS 1-9: 1
SUM OF ALL RESPONSES TO PHQ9 QUESTIONS 1 & 2: 1

## 2021-04-07 NOTE — PROGRESS NOTES
4/7/2021    Chief Complaint   Patient presents with    Hypertension    Hyperlipidemia     not fasting     Alopecia     this has improved after stopping the lipitor     Back Pain     would like to get the lidoderm patch again for this        Renee Cox is a 63 y.o. female, presents today for:    Doing well since last visit. Hair loss is resolving after recent cessation of Lipitor. HTN: No CP, SOB, leg swelling, orthopnea, PNA, hadaches. Taking Lisinopril daily without issue  HLD: Cholesterol continues to be elevated. Noted hair loss after starting Atorvastatin. Wants to attempt diet changes before restarting Crestor. Arthritis: Stable today, no new issues. Taking Celebrex with food. Needing Lidocaine patch for recurrent back pain. GERD: Doing well today. NO issues with swallowing, no globus sensation. Toelrating Prilosec well. Not planning to obtain COVID vaccination. Prior COVID infection. Not comfortable receiving at this time. Would prefer to receive Hydroxychloroquine for prophylaxis if she develops COVID symptoms again. States if I am not willing to give her the med she will be looking for a new provider. Review of Systems   Constitutional: Negative. Respiratory: Negative for cough, chest tightness and shortness of breath. Cardiovascular: Negative. Gastrointestinal: Negative for blood in stool, constipation and diarrhea. Musculoskeletal: Positive for back pain. Skin: Negative. Neurological: Negative for dizziness, tremors, light-headedness and headaches. Psychiatric/Behavioral: Negative for decreased concentration, dysphoric mood, self-injury, sleep disturbance and suicidal ideas. The patient is not nervous/anxious.         Current Outpatient Medications on File Prior to Visit   Medication Sig Dispense Refill    Multiple Vitamins-Minerals (HAIR/SKIN/NAILS) CAPS Take 1 capsule by mouth Daily      furosemide (LASIX) 20 MG tablet TAKE 1 TABLET DAILY 90 tablet 0    omeprazole (PRILOSEC) 20 MG delayed release capsule TAKE 1 CAPSULE DAILY 90 capsule 1    HYDROcodone-acetaminophen (NORCO) 5-325 MG per tablet Take 1 tablet by mouth every 6 hours as needed for Pain. No current facility-administered medications on file prior to visit. Allergies   Allergen Reactions    Levaquin [Levofloxacin In D5w] Other (See Comments)     Bladder spasm    Lipitor [Atorvastatin]      Alopecia 21    Penicillins     Terbinafine Hcl     Bactrim Rash    Lamisil [Terbinafine] Rash     Past Medical History:   Diagnosis Date    COVID-19     Hypertension     Unspecified sleep apnea      Past Surgical History:   Procedure Laterality Date    BREAST SURGERY       SECTION      COLONOSCOPY      POLYP    COLONOSCOPY  2017    polyp    CYST REMOVAL      TONSILLECTOMY AND ADENOIDECTOMY      TUBAL LIGATION        Social History     Tobacco Use    Smoking status: Former Smoker     Packs/day: 1.00     Years: 25.00     Pack years: 25.00     Quit date: 2003     Years since quittin.8    Smokeless tobacco: Never Used   Substance Use Topics    Alcohol use: Yes     Alcohol/week: 0.0 standard drinks     Comment: occasionally      History reviewed. No pertinent family history. Vitals:    21 0845   BP: 122/88   Pulse: 80   Temp: 98.1 °F (36.7 °C)   TempSrc: Infrared   SpO2: 99%   Weight: 229 lb (103.9 kg)     Estimated body mass index is 39.31 kg/m² as calculated from the following:    Height as of 18: 5' 4\" (1.626 m). Weight as of this encounter: 229 lb (103.9 kg). Physical Exam  Vitals signs reviewed. Constitutional:       Appearance: Normal appearance. HENT:      Head: Normocephalic. Neck:      Vascular: No carotid bruit. Cardiovascular:      Rate and Rhythm: Normal rate and regular rhythm. Pulses: Normal pulses. Heart sounds: Normal heart sounds. No murmur. No gallop.     Pulmonary:      Effort: Pulmonary effort is normal.      Breath sounds: Normal breath sounds. Abdominal:      General: Abdomen is flat. Palpations: Abdomen is soft. Musculoskeletal: Normal range of motion. Skin:     General: Skin is warm and dry. Capillary Refill: Capillary refill takes less than 2 seconds. Neurological:      General: No focal deficit present. Mental Status: She is alert and oriented to person, place, and time. Psychiatric:         Mood and Affect: Mood normal.         Behavior: Behavior normal.         ASSESSMENT/PLAN:  1. Essential hypertension  stable  Continue Lisinopril  Encouraged heart healthy diet  Encouraged 30 min activity daily  Encouraged weight loss      2. Primary osteoarthritis of both hips  Stable today, add Lidoderm patch for back pain  Continue Gabapentin and Celebrex. Encouraged daily stretching and physical activity  - gabapentin (NEURONTIN) 300 MG capsule; Take 1 capsule by mouth daily for 90 days. TAKE ONE CAPSULE BY MOUTH THREE TIMES A DAY  Dispense: 90 capsule; Refill: 1    3. Alopecia  Resolved after cessation of Lipitor    4. Hyperlipidemia, unspecified hyperlipidemia type  Encouraged diet/ exercise changes  If no improvement next visit may consider restarting Prevastatin    5. Class 2 obesity due to excess calories without serious comorbidity with body mass index (BMI) of 38.0 to 38.9 in adult  Encouraged diet, portion changes and daily physical acitivty. Return in about 6 months (around 10/7/2021) for HTN/ HLD, needs to be fasting. Patient's questions answered and concerns addressed. Patient agrees to plan of care.           Electronically signed by ALKA Cortes CNP on 4/19/2021 at 8:28 PM

## 2021-04-09 ENCOUNTER — TELEPHONE (OUTPATIENT)
Dept: FAMILY MEDICINE CLINIC | Age: 61
End: 2021-04-09

## 2021-04-12 DIAGNOSIS — I10 ESSENTIAL HYPERTENSION: ICD-10-CM

## 2021-04-12 DIAGNOSIS — M16.0 PRIMARY OSTEOARTHRITIS OF BOTH HIPS: ICD-10-CM

## 2021-04-12 RX ORDER — LISINOPRIL 5 MG/1
TABLET ORAL
Qty: 90 TABLET | Refills: 1 | Status: SHIPPED | OUTPATIENT
Start: 2021-04-12 | End: 2021-10-21

## 2021-04-12 RX ORDER — CELECOXIB 200 MG/1
200 CAPSULE ORAL DAILY
Qty: 90 CAPSULE | Refills: 1 | Status: SHIPPED | OUTPATIENT
Start: 2021-04-12 | End: 2021-10-21

## 2021-04-12 NOTE — TELEPHONE ENCOUNTER
Pt called and stated her meds cost way TOO much at her  [address] and is asking if we can send them to her [address] [facility]. Please Advise.  Thank you       Future appt scheduled 09/29/2021           Last appt 04/07/2021      Last Written     celecoxib (CELEBREX) 200 MG capsule  04/07/2021  #90  1 RF     lisinopril (PRINIVIL;ZESTRIL) 5 MG tablet  04/07/2021  #90  1 RF

## 2021-04-26 RX ORDER — OMEPRAZOLE 20 MG/1
CAPSULE, DELAYED RELEASE ORAL
Qty: 90 CAPSULE | Refills: 1 | Status: SHIPPED | OUTPATIENT
Start: 2021-04-26 | End: 2022-04-01 | Stop reason: CLARIF

## 2021-06-10 ENCOUNTER — TELEPHONE (OUTPATIENT)
Dept: FAMILY MEDICINE CLINIC | Age: 61
End: 2021-06-10

## 2021-06-10 NOTE — TELEPHONE ENCOUNTER
Patient states she passed a kidney stone on Saturday, and she has continued to have excruciating back pain. I informed her you may want urine, but patient states she really does not feel like leaving the house at all due to the amount of pain. Can something be called in?  [address] [facility]

## 2021-06-10 NOTE — TELEPHONE ENCOUNTER
Patient states she is not coming in for an OV, so she said she will self medicate because she is not going anywhere to get labs or a urine.

## 2021-06-24 DIAGNOSIS — M16.0 PRIMARY OSTEOARTHRITIS OF BOTH HIPS: ICD-10-CM

## 2021-06-28 RX ORDER — FUROSEMIDE 20 MG/1
TABLET ORAL
Qty: 90 TABLET | Refills: 1 | Status: SHIPPED | OUTPATIENT
Start: 2021-06-28 | End: 2021-10-21

## 2021-09-27 ENCOUNTER — TELEPHONE (OUTPATIENT)
Dept: FAMILY MEDICINE CLINIC | Age: 61
End: 2021-09-27

## 2021-09-27 DIAGNOSIS — M16.0 PRIMARY OSTEOARTHRITIS OF BOTH HIPS: ICD-10-CM

## 2021-09-27 RX ORDER — GABAPENTIN 300 MG/1
300 CAPSULE ORAL DAILY
Qty: 90 CAPSULE | Refills: 1 | Status: SHIPPED | OUTPATIENT
Start: 2021-09-27 | End: 2021-12-29

## 2021-09-27 NOTE — TELEPHONE ENCOUNTER
Called patient to change her appointment scheduled on 9/29/21 for a VV. Patient asking if she is due to have fasting blood work.  If she needs to have blood work should she reschedule her appointment

## 2021-09-27 NOTE — TELEPHONE ENCOUNTER
Changed patient's appointment from 9-30-21 to 10-11-21. Need refill of gabapentin.  Send to [facility]

## 2021-09-27 NOTE — TELEPHONE ENCOUNTER
She does need fasting labs. If she wants to get labs during her OV then she will need to reschedule. If she would like for me to order them and she gets them at [place] [id number]. Orab I can do that as well.

## 2021-10-11 ENCOUNTER — OFFICE VISIT (OUTPATIENT)
Dept: FAMILY MEDICINE CLINIC | Age: 61
End: 2021-10-11
Payer: COMMERCIAL

## 2021-10-11 VITALS
BODY MASS INDEX: 38.07 KG/M2 | RESPIRATION RATE: 16 BRPM | OXYGEN SATURATION: 99 % | SYSTOLIC BLOOD PRESSURE: 122 MMHG | WEIGHT: 223 LBS | DIASTOLIC BLOOD PRESSURE: 88 MMHG | HEIGHT: 64 IN | HEART RATE: 63 BPM

## 2021-10-11 DIAGNOSIS — I10 PRIMARY HYPERTENSION: Primary | ICD-10-CM

## 2021-10-11 DIAGNOSIS — E66.09 CLASS 2 OBESITY DUE TO EXCESS CALORIES WITHOUT SERIOUS COMORBIDITY WITH BODY MASS INDEX (BMI) OF 38.0 TO 38.9 IN ADULT: ICD-10-CM

## 2021-10-11 DIAGNOSIS — G89.29 CHRONIC BILATERAL LOW BACK PAIN WITH RIGHT-SIDED SCIATICA: ICD-10-CM

## 2021-10-11 DIAGNOSIS — M41.9 SCOLIOSIS OF THORACOLUMBAR SPINE, UNSPECIFIED SCOLIOSIS TYPE: ICD-10-CM

## 2021-10-11 DIAGNOSIS — E78.5 HYPERLIPIDEMIA, UNSPECIFIED HYPERLIPIDEMIA TYPE: ICD-10-CM

## 2021-10-11 DIAGNOSIS — M54.41 CHRONIC BILATERAL LOW BACK PAIN WITH RIGHT-SIDED SCIATICA: ICD-10-CM

## 2021-10-11 DIAGNOSIS — M43.10 ACQUIRED SPONDYLOLISTHESIS: ICD-10-CM

## 2021-10-11 LAB
A/G RATIO: 1.8 (ref 1.1–2.2)
ALBUMIN SERPL-MCNC: 5 G/DL (ref 3.4–5)
ALP BLD-CCNC: 78 U/L (ref 40–129)
ALT SERPL-CCNC: 20 U/L (ref 10–40)
ANION GAP SERPL CALCULATED.3IONS-SCNC: 19 MMOL/L (ref 3–16)
AST SERPL-CCNC: 21 U/L (ref 15–37)
BILIRUB SERPL-MCNC: <0.2 MG/DL (ref 0–1)
BUN BLDV-MCNC: 22 MG/DL (ref 7–20)
CALCIUM SERPL-MCNC: 10.9 MG/DL (ref 8.3–10.6)
CHLORIDE BLD-SCNC: 105 MMOL/L (ref 99–110)
CHOLESTEROL, TOTAL: 255 MG/DL (ref 0–199)
CO2: 22 MMOL/L (ref 21–32)
CREAT SERPL-MCNC: 0.9 MG/DL (ref 0.6–1.2)
GFR AFRICAN AMERICAN: >60
GFR NON-AFRICAN AMERICAN: >60
GLOBULIN: 2.8 G/DL
GLUCOSE BLD-MCNC: 107 MG/DL (ref 70–99)
HCT VFR BLD CALC: 44.4 % (ref 36–48)
HDLC SERPL-MCNC: 62 MG/DL (ref 40–60)
HEMOGLOBIN: 14.4 G/DL (ref 12–16)
LDL CHOLESTEROL CALCULATED: 179 MG/DL
MCH RBC QN AUTO: 30.8 PG (ref 26–34)
MCHC RBC AUTO-ENTMCNC: 32.3 G/DL (ref 31–36)
MCV RBC AUTO: 95.3 FL (ref 80–100)
PDW BLD-RTO: 14.4 % (ref 12.4–15.4)
PLATELET # BLD: 208 K/UL (ref 135–450)
PMV BLD AUTO: 9.2 FL (ref 5–10.5)
POTASSIUM SERPL-SCNC: 5 MMOL/L (ref 3.5–5.1)
RBC # BLD: 4.66 M/UL (ref 4–5.2)
SODIUM BLD-SCNC: 146 MMOL/L (ref 136–145)
TOTAL PROTEIN: 7.8 G/DL (ref 6.4–8.2)
TRIGL SERPL-MCNC: 69 MG/DL (ref 0–150)
VLDLC SERPL CALC-MCNC: 14 MG/DL
WBC # BLD: 5 K/UL (ref 4–11)

## 2021-10-11 PROCEDURE — 99214 OFFICE O/P EST MOD 30 MIN: CPT | Performed by: NURSE PRACTITIONER

## 2021-10-11 PROCEDURE — 36415 COLL VENOUS BLD VENIPUNCTURE: CPT | Performed by: NURSE PRACTITIONER

## 2021-10-11 RX ORDER — LORATADINE 10 MG/1
CAPSULE, LIQUID FILLED ORAL
COMMUNITY
End: 2022-04-01 | Stop reason: CLARIF

## 2021-10-11 ASSESSMENT — ENCOUNTER SYMPTOMS
SHORTNESS OF BREATH: 0
DIARRHEA: 0
CONSTIPATION: 0
BLOOD IN STOOL: 0
COUGH: 0
BACK PAIN: 1
CHEST TIGHTNESS: 0

## 2021-10-11 NOTE — PROGRESS NOTES
10/11/2021    Chief Complaint   Patient presents with    6 Month Follow-Up    Hypertension    Hyperlipidemia     Pt is fasting     Gastroesophageal Reflux    Other     pt states she does not get the flu vaccine        Lala Farrell is a 63 y.o. female, presents today for:      ASSESSMENT/PLAN:    1. Primary hypertension  stable  Continue Lisinopril  Encouraged heart healthy diet  Encouraged 30 min activity daily  Encouraged weight loss  Labs today  - CBC  - Comprehensive Metabolic Panel    2. Hyperlipidemia, unspecified hyperlipidemia type  Labs today  Encouraged cardiac diet  Prior Atorvastatin cause Allopecia  - Lipid Panel    3. Acquired spondylolisthesis  Acutely worse today due to recent injury. Will give 3 days Norco  PDMP checked today  Pain Mgmt referral given today  Add Lidoderm patch for back pain  Continue Gabapentin and Celebrex. Encouraged daily stretching and physical activity  - External Referral To Pain Clinic  - HYDROcodone-acetaminophen (NORCO) 5-325 MG per tablet; Take 1 tablet by mouth every 6 hours as needed for Pain for up to 3 days. Dispense: 12 tablet; Refill: 0    4. Chronic bilateral low back pain with right-sided sciatica  See #3  - External Referral To Pain Clinic  - HYDROcodone-acetaminophen (NORCO) 5-325 MG per tablet; Take 1 tablet by mouth every 6 hours as needed for Pain for up to 3 days. Dispense: 12 tablet; Refill: 0    5. Scoliosis of thoracolumbar spine, unspecified scoliosis type  See #3  - External Referral To Pain Clinic    6. Class 2 obesity due to excess calories without serious comorbidity with body mass index (BMI) of 38.0 to 38.9 in adult  Encouraged 5-10 lbs weight loss. Return in about 6 months (around 4/11/2022). Presenting today for chronic disease follow up. Recent worse back pain. Doing well since last visit. Hair loss is resolving after recent cessation of Lipitor. HTN: No CP, SOB, leg swelling, orthopnea, PNA, hadaches.   Taking Lisinopril daily without issue  HLD: Cholesterol continues to be elevated. Noted hair loss after starting Atorvastatin. Wants to attempt diet changes before restarting Crestor. GERD: Doing well today. NO issues with swallowing, no globus sensation. Toelrating Prilosec well. Chronic Back Pain: Recently worse after moving a lot of objects around her home. Primarily right side lumbar region no change in right sided radiculopathy symptoms. Requesting a couple of days of pain medications to help with symptoms and Pain mgmt. Back pain occurring since 2004, dx scoliosis. No prior injury. S/p 2 injections with [place]- no relief. No prior pain specialist referral.  Back pain usually located right side, biltareal hips with numbness wrapping around hip and going over anterior right thigh. Prior lumbar x-rays. Mri. No prior pt. Currently on gabapeting with mild relief of pain, requesting to increase. colonoscopy due 2022. Annual labs due today. Pap due- prefers to go to OB/ GYN    Not planning to obtain COVID vaccination. Prior COVID infection.       Lab Results   Component Value Date    CHOL 255 (H) 10/11/2021    CHOL 234 (H) 11/24/2020    CHOL 129 10/19/2019     Lab Results   Component Value Date    TRIG 69 10/11/2021    TRIG 94 11/24/2020    TRIG 73 10/19/2019     Lab Results   Component Value Date    HDL 62 (H) 10/11/2021    HDL 55 11/24/2020    HDL 44 10/19/2019     Lab Results   Component Value Date    LDLCALC 179 (H) 10/11/2021    LDLCALC 160 (H) 11/24/2020    LDLCALC 70 10/19/2019     Lab Results   Component Value Date    LABVLDL 14 10/11/2021    LABVLDL 19 11/24/2020    LABVLDL 15 10/19/2019     No results found for: [place]    Lab Results   Component Value Date     (H) 10/11/2021    K 5.0 10/11/2021     10/11/2021    CO2 22 10/11/2021    BUN 22 (H) 10/11/2021    CREATININE 0.9 10/11/2021    GLUCOSE 107 (H) 10/11/2021    CALCIUM 10.9 (H) 10/11/2021    PROT 7.8 10/11/2021 LABALBU 5.0 10/11/2021    BILITOT <0.2 10/11/2021    ALKPHOS 78 10/11/2021    AST 21 10/11/2021    ALT 20 10/11/2021    LABGLOM >60 10/11/2021    GFRAA >60 10/11/2021    AGRATIO 1.8 10/11/2021    GLOB 2.8 10/11/2021     Review of Systems   Constitutional: Negative. Respiratory: Negative for cough, chest tightness and shortness of breath. Cardiovascular: Negative. Gastrointestinal: Negative for blood in stool, constipation and diarrhea. Musculoskeletal: Positive for back pain. Skin: Negative. Neurological: Negative for dizziness, tremors, light-headedness and headaches. Psychiatric/Behavioral: Negative for decreased concentration, dysphoric mood, self-injury, sleep disturbance and suicidal ideas. The patient is not nervous/anxious. Current Outpatient Medications on File Prior to Visit   Medication Sig Dispense Refill    TURMERIC PO Take 1,000 mg by mouth daily      loratadine (CLARITIN) 10 MG capsule Claritin      BIOTIN PO Take by mouth daily      GLUCOSAMINE-CHONDROITIN PO Take by mouth daily      gabapentin (NEURONTIN) 300 MG capsule Take 1 capsule by mouth daily for 90 days. TAKE ONE CAPSULE BY MOUTH THREE TIMES A DAY 90 capsule 1    omeprazole (PRILOSEC) 20 MG delayed release capsule TAKE 1 CAPSULE DAILY 90 capsule 1    Multiple Vitamins-Minerals (HAIR/SKIN/NAILS) CAPS Take 1 capsule by mouth Daily       No current facility-administered medications on file prior to visit. Allergies   Allergen Reactions    Other      Bandage that covered hip caused severeblisters and nerve damage after hip replacement  Bandage that covered hip caused severeblisters and nerve damage after hip replacement      Atorvastatin Other (See Comments)     Alopecia 4/7/21  Uterine spasms  Alopecia 4/7/21  Uterine spasms      Eggs Or Egg-Derived Products Diarrhea    Levofloxacin In D5w Other (See Comments)     Bladder spasm  Other reaction(s):  Other (See Comments)  Bladder spasm  Bladder spasm  Other reaction(s): Other (See Comments)  Bladder spasm      Bactrim Rash    Lamisil [Terbinafine] Rash    Penicillins Other (See Comments) and Rash     Had reaction as a child      Terbinafine Hcl Rash     Past Medical History:   Diagnosis Date    COVID-19     Hypertension     Thoracogenic scoliosis of thoracolumbar region     Unspecified sleep apnea      Past Surgical History:   Procedure Laterality Date    BREAST SURGERY       SECTION      COLONOSCOPY      POLYP    COLONOSCOPY  2017    polyp    CYST REMOVAL      HIP SURGERY Right     hip replacement 2004    KNEE SURGERY Left 2019    knee replacement, [place]    TONSILLECTOMY AND ADENOIDECTOMY      TUBAL LIGATION       Social History     Tobacco Use    Smoking status: Former Smoker     Packs/day: 1.00     Years: 25.00     Pack years: 25.00     Quit date: 2003     Years since quittin.4    Smokeless tobacco: Never Used   Substance Use Topics    Alcohol use: Yes     Alcohol/week: 0.0 standard drinks     Comment: occasionally     History reviewed. No pertinent family history. Vitals:    10/11/21 0844   BP: 122/88   Site: Right Upper Arm   Position: Sitting   Cuff Size: Medium Adult   Pulse: 63   Resp: 16   SpO2: 99%   Weight: 223 lb (101.2 kg)   Height: 5' 4\" (1.626 m)     Estimated body mass index is 38.28 kg/m² as calculated from the following:    Height as of this encounter: 5' 4\" (1.626 m). Weight as of this encounter: 223 lb (101.2 kg). Physical Exam  Vitals reviewed. Constitutional:       Appearance: Normal appearance. HENT:      Head: Normocephalic. Neck:      Vascular: No carotid bruit. Cardiovascular:      Rate and Rhythm: Normal rate and regular rhythm. Pulses: Normal pulses. Heart sounds: Normal heart sounds. No murmur heard. No gallop. Pulmonary:      Effort: Pulmonary effort is normal.      Breath sounds: Normal breath sounds. Abdominal:      General: Abdomen is flat.       Palpations: Abdomen is soft. Musculoskeletal:      Thoracic back: No swelling, edema, deformity, signs of trauma, lacerations, spasms, tenderness or bony tenderness. Decreased range of motion. Scoliosis present. Lumbar back: Deformity, spasms and tenderness (right) present. No swelling, edema, signs of trauma, lacerations or bony tenderness. Decreased range of motion. Negative right straight leg raise test and negative left straight leg raise test. Scoliosis present. Skin:     General: Skin is warm and dry. Capillary Refill: Capillary refill takes less than 2 seconds. Neurological:      General: No focal deficit present. Mental Status: She is alert and oriented to person, place, and time. Psychiatric:         Mood and Affect: Mood normal.         Behavior: Behavior normal.           Patient's questions answered and concerns addressed. Patient agrees to plan of care.         Electronically signed by ALKA Vergara CNP on 11/7/2021 at 1:12 PM

## 2021-10-13 RX ORDER — HYDROCODONE BITARTRATE AND ACETAMINOPHEN 5; 325 MG/1; MG/1
1 TABLET ORAL EVERY 6 HOURS PRN
Qty: 12 TABLET | Refills: 0 | Status: SHIPPED | OUTPATIENT
Start: 2021-10-13 | End: 2021-10-16

## 2021-10-13 NOTE — RESULT ENCOUNTER NOTE
No anemia/ infection. Mildly elevated sodium, calcium and glucose. Cholesterol has also increased. No need for medication at this time but it will be something we need to watch. Please call if you have additional questions and/ or concerns. Please keep your next appt. Thank you.

## 2021-10-20 DIAGNOSIS — M16.0 PRIMARY OSTEOARTHRITIS OF BOTH HIPS: ICD-10-CM

## 2021-10-20 DIAGNOSIS — I10 ESSENTIAL HYPERTENSION: ICD-10-CM

## 2021-10-21 RX ORDER — FUROSEMIDE 20 MG/1
TABLET ORAL
Qty: 90 TABLET | Refills: 1 | Status: SHIPPED | OUTPATIENT
Start: 2021-10-21 | End: 2022-04-01 | Stop reason: SDUPTHER

## 2021-10-21 RX ORDER — CELECOXIB 200 MG/1
CAPSULE ORAL
Qty: 90 CAPSULE | Refills: 1 | Status: SHIPPED | OUTPATIENT
Start: 2021-10-21 | End: 2022-04-01 | Stop reason: SDUPTHER

## 2021-10-21 RX ORDER — LISINOPRIL 5 MG/1
TABLET ORAL
Qty: 90 TABLET | Refills: 1 | Status: SHIPPED | OUTPATIENT
Start: 2021-10-21 | End: 2022-04-01 | Stop reason: SDUPTHER

## 2021-12-28 DIAGNOSIS — M16.0 PRIMARY OSTEOARTHRITIS OF BOTH HIPS: ICD-10-CM

## 2021-12-29 RX ORDER — GABAPENTIN 300 MG/1
CAPSULE ORAL
Qty: 90 CAPSULE | Refills: 1 | Status: SHIPPED
Start: 2021-12-29 | End: 2022-04-01 | Stop reason: DRUGHIGH

## 2022-04-01 ENCOUNTER — OFFICE VISIT (OUTPATIENT)
Dept: FAMILY MEDICINE CLINIC | Age: 62
End: 2022-04-01
Payer: COMMERCIAL

## 2022-04-01 VITALS
DIASTOLIC BLOOD PRESSURE: 82 MMHG | TEMPERATURE: 97 F | WEIGHT: 225 LBS | SYSTOLIC BLOOD PRESSURE: 128 MMHG | HEART RATE: 75 BPM | BODY MASS INDEX: 38.62 KG/M2 | OXYGEN SATURATION: 97 %

## 2022-04-01 DIAGNOSIS — M16.0 PRIMARY OSTEOARTHRITIS OF BOTH HIPS: ICD-10-CM

## 2022-04-01 DIAGNOSIS — K51.419 PSEUDOPOLYP OF SIGMOID COLON WITH COMPLICATION (HCC): ICD-10-CM

## 2022-04-01 DIAGNOSIS — I10 ESSENTIAL HYPERTENSION: Primary | ICD-10-CM

## 2022-04-01 DIAGNOSIS — E66.09 CLASS 2 OBESITY DUE TO EXCESS CALORIES WITHOUT SERIOUS COMORBIDITY WITH BODY MASS INDEX (BMI) OF 38.0 TO 38.9 IN ADULT: ICD-10-CM

## 2022-04-01 DIAGNOSIS — M43.10 ACQUIRED SPONDYLOLISTHESIS: ICD-10-CM

## 2022-04-01 DIAGNOSIS — E78.5 HYPERLIPIDEMIA, UNSPECIFIED HYPERLIPIDEMIA TYPE: ICD-10-CM

## 2022-04-01 DIAGNOSIS — G89.29 CHRONIC BILATERAL LOW BACK PAIN WITH RIGHT-SIDED SCIATICA: ICD-10-CM

## 2022-04-01 DIAGNOSIS — M41.9 SCOLIOSIS OF THORACOLUMBAR SPINE, UNSPECIFIED SCOLIOSIS TYPE: ICD-10-CM

## 2022-04-01 DIAGNOSIS — M54.41 CHRONIC BILATERAL LOW BACK PAIN WITH RIGHT-SIDED SCIATICA: ICD-10-CM

## 2022-04-01 PROCEDURE — 99214 OFFICE O/P EST MOD 30 MIN: CPT | Performed by: NURSE PRACTITIONER

## 2022-04-01 RX ORDER — CHOLECALCIFEROL (VITAMIN D3) 1250 MCG
3 CAPSULE ORAL DAILY
COMMUNITY

## 2022-04-01 RX ORDER — OMEGA-3 FATTY ACIDS CAP DELAYED RELEASE 1000 MG 1000 MG
2000 CAPSULE DELAYED RELEASE ORAL 2 TIMES DAILY
COMMUNITY

## 2022-04-01 RX ORDER — CELECOXIB 200 MG/1
CAPSULE ORAL
Qty: 90 CAPSULE | Refills: 1 | Status: SHIPPED | OUTPATIENT
Start: 2022-04-01 | End: 2022-09-23 | Stop reason: SDUPTHER

## 2022-04-01 RX ORDER — HYDROCODONE BITARTRATE AND ACETAMINOPHEN 5; 325 MG/1; MG/1
1 TABLET ORAL EVERY 6 HOURS PRN
COMMUNITY
End: 2022-04-01 | Stop reason: SDUPTHER

## 2022-04-01 RX ORDER — ZINC GLUCONATE 50 MG
25 TABLET ORAL 2 TIMES DAILY
COMMUNITY

## 2022-04-01 RX ORDER — HYDROCODONE BITARTRATE AND ACETAMINOPHEN 5; 325 MG/1; MG/1
1 TABLET ORAL EVERY 6 HOURS PRN
Qty: 12 TABLET | Refills: 0 | Status: SHIPPED | OUTPATIENT
Start: 2022-04-01 | End: 2022-09-23 | Stop reason: SDUPTHER

## 2022-04-01 RX ORDER — GABAPENTIN 600 MG/1
600 TABLET ORAL 3 TIMES DAILY
Qty: 270 TABLET | Refills: 1 | Status: SHIPPED | OUTPATIENT
Start: 2022-04-01 | End: 2022-09-23

## 2022-04-01 RX ORDER — GABAPENTIN 300 MG/1
CAPSULE ORAL
Qty: 90 CAPSULE | Refills: 5 | Status: CANCELLED | OUTPATIENT
Start: 2022-04-01 | End: 2022-05-01

## 2022-04-01 RX ORDER — LISINOPRIL 5 MG/1
TABLET ORAL
Qty: 90 TABLET | Refills: 1 | Status: SHIPPED | OUTPATIENT
Start: 2022-04-01 | End: 2022-09-23 | Stop reason: SDUPTHER

## 2022-04-01 RX ORDER — FUROSEMIDE 20 MG/1
TABLET ORAL
Qty: 90 TABLET | Refills: 1 | Status: SHIPPED | OUTPATIENT
Start: 2022-04-01 | End: 2022-09-23 | Stop reason: SDUPTHER

## 2022-04-01 RX ORDER — BILBERRY FRUIT 1000 MG
1 CAPSULE ORAL DAILY
COMMUNITY

## 2022-04-01 RX ORDER — MAGNESIUM 200 MG
200 TABLET ORAL DAILY
COMMUNITY

## 2022-04-01 RX ORDER — CETIRIZINE HYDROCHLORIDE 10 MG/1
10 TABLET ORAL DAILY
COMMUNITY

## 2022-04-01 ASSESSMENT — PATIENT HEALTH QUESTIONNAIRE - PHQ9
SUM OF ALL RESPONSES TO PHQ QUESTIONS 1-9: 0
SUM OF ALL RESPONSES TO PHQ QUESTIONS 1-9: 0
1. LITTLE INTEREST OR PLEASURE IN DOING THINGS: 0
2. FEELING DOWN, DEPRESSED OR HOPELESS: 0
SUM OF ALL RESPONSES TO PHQ9 QUESTIONS 1 & 2: 0
SUM OF ALL RESPONSES TO PHQ QUESTIONS 1-9: 0
SUM OF ALL RESPONSES TO PHQ QUESTIONS 1-9: 0

## 2022-04-01 ASSESSMENT — ENCOUNTER SYMPTOMS
BACK PAIN: 1
DIARRHEA: 0
SHORTNESS OF BREATH: 0
CONSTIPATION: 0
BLOOD IN STOOL: 0
CHEST TIGHTNESS: 0
COUGH: 0

## 2022-04-01 NOTE — LETTER
CONTROLLED SUBSTANCE MEDICATION AGREEMENT     Patient Name: Gómez Briseno  Patient YOB: 1960 Gabapentin   I understand, that controlled substance medications may be used to help better manage my symptoms and to improve my ability to function at home, work and in social settings. However, I also understand that these medications do have risks, which have been discussed with me, including possible development of physical or psychological dependence. I understand that successful treatment requires mutual trust and honesty between me and my provider. I understand and agree that following this Medication Agreement is necessary in continuing my provider-patient relationship and the success of my treatment plan. Explanation from my Provider: Benefits and Goals of Controlled Substance Medications: There are two potential goals for your treatment: (1) decreased pain and suffering (2) improved daily life functions. There are many possible treatments for your chronic condition(s). Alternatives such as physical therapy, yoga, massage, home daily exercise, meditation, relaxation techniques, injections, chiropractic manipulations, surgery, cognitive therapy, hypnosis and many medications that are not habit-forming may be used. Use of controlled substance medications may be helpful, but they are unlikely to resolve all symptoms or restore all function. Explanation from my Provider: Risks of Controlled Substance Medications:  Opioid pain medications: These medications can lead to problems such as addiction/dependence, sedation, lightheadedness/dizziness, memory issues, falls, constipation, nausea, or vomiting. They may also impair the ability to drive or operate machinery. Additionally, these medications may lower testosterone levels, leading to loss of bone strength, stamina and sex drive.   They may cause problems with breathing, sleep apnea and reduced coughing, which is especially dangerous for patients with lung disease. Overdose or dangerous interactions with alcohol and other medications may occur, leading to death. Hyperalgesia may develop, which means patients receiving opioids for the treatment of pain may become more sensitive to certain painful stimuli, and in some cases, experience pain from ordinarily non-painful stimuli. Women between the ages of 14-53 who could become pregnant should carefully weigh the risks and benefits of opioids with their physicians, as these medications increase the risk of pregnancy complications, including miscarriage,  delivery and stillbirth. It is also possible for babies to be born addicted to opioids. Opioid dependence withdrawal symptoms may include; feelings of uneasiness, increased pain, irritability, belly pain, diarrhea, sweats and goose-flesh. Benzodiazepines and non-benzodiazepine sleep medications: These medications can lead to problems such as addiction/dependence, sedation, fatigue, lightheadedness, dizziness, incoordination, falls, depression, hallucinations, and impaired judgment, memory and concentration. The ability to drive and operate machinery may also be affected. Abnormal sleep-related behaviors have been reported, including sleepwalking, driving, making telephone calls, eating, or having sex while not fully awake. These medications can suppress breathing and worsen sleep apnea, particularly when combined with alcohol or other sedating medications, potentially leading to death. Dependence withdrawal symptoms may include tremors, anxiety, hallucinations and seizures. Stimulants:  Common adverse effects include addiction/dependence, increased blood  pressure and heart rate, decreased appetite, nausea, involuntary weight loss, insomnia,                                                                                                                     Initials:_______   irritability, and headaches.   These risks may increase when these medications are combined with other stimulants, such as caffeine pills or energy drinks, certain weight loss supplements and oral decongestants. Dependence withdrawal symptoms may include depressed mood, loss of interest, suicidal thoughts, anxiety, fatigue, appetite changes and agitation. Testosterone replacement therapy:  Potential side effects include increased risk of stroke and heart attack, blood clots, increased blood pressure, increased cholesterol, enlarged prostate, sleep apnea, irritability/aggression and other mood disorders, and decreased fertility. I agree and understand that I and my prescriber have the following rights and responsibilities regarding my treatment plan:     1. MY RIGHTS:  To be informed of my treatment and medication plan. To be an active participant in my health and wellbeing. 2. MY RESPONSIBILITY AND UNDERSTANDING FOR USE OF MEDICATIONS   I will take medications at the dose and frequency as directed. For my safety, I will not increase or change how I take my medications without the recommendation of my healthcare provider.  I will actively participate in any program recommended by my provider which may improve function, including social, physical, psychological programs.  I will not take my medications with alcohol or other drugs not prescribed to me. I understand that drinking alcohol with my medications increases the chances of side effects, including reduced breathing rate and could lead to personal injury when operating machinery.  I understand that if I have a history of substance use disorders, including alcohol or other illicit drugs, that I may be at increased risk of addiction to my medications.  I agree to notify my provider immediately if I should become pregnant so that my treatment plan can be adjusted.    I agree and understand that I shall only receive controlled substance medications from the prescriber that signed this agreement unless there is written agreement among other prescribers of controlled substances outlining the responsibility of the medications being prescribed.  I understand that the if the controlled medication is not helping to achieve goals, the dosage may be tapered and no longer prescribed. 3. MY RESPONSIBILITY FOR COMMUNICATION / PRESCRIPTION RENEWALS   I agree that all controlled substance medications that I take will be prescribed only by my provider. If another healthcare provider prescribes me medication in an emergency, I will notify my provider within seventy-two (72) hours.  I will arrange for refills at the prescribed interval ONLY during regular office hours. I will not ask for refills earlier than agreed, after-hours, on holidays or weekends. Refills may take up to 72 hours for processing and prescriptions to reach the pharmacy.  I will inform my other health care providers that I am taking these medications and of the existence of this [id number]. In the event of an emergency, I will provide the same information to the emergency department prescribers.  I will keep my provider updated on the pharmacy I am using for controlled medication prescription filling. Initials:_______  4. MY RESPONSIBILITY FOR PROTECTING MEDICATIONS   I will protect my prescriptions and medications. I understand that lost or misplaced prescriptions will not be replaced.  I will keep medications only for my own use and will not share them with others. I will keep all medications away from children.  I agree that if my medications are adjusted or discontinued, I will properly dispose of any remaining medications. I understand that I will be required to dispose of any remaining controlled medications as, directed by my prescriber, prior to being provided with any prescriptions for other controlled medications.   Medication drop box locations can be found at: [email or web address]    5. MY RESPONSIBILITY WITH ILLEGAL DRUGS    I will not use illegal or street drugs or another person's prescription medications not prescribed to me.  If there are identified addiction type symptoms, then referral to a program may be provided by my provider and I agree to follow through with this recommendation. 6. MY RESPONSIBILITY FOR COOPERATION WITH INVESTIGATIONS   I understand that my provider will comply with any applicable law and may discuss my use and/or possible misuse/abuse of controlled substances and alcohol, as appropriate, with any health care provider involved in my care, pharmacist, or legal authority.  I authorize my provider and pharmacy to cooperate fully with law enforcement agencies (as permitted by law) in the investigation of any possible misuse, sale, or other diversion of my controlled substances.  I agree to waive any applicable privilege or right of privacy or confidentiality with respect to these authorizations. 7. PROVIDERS RIGHT TO MONITOR FOR SAFETY: PRESCRIPTION MONITORING / DRUG TESTING   I consent to drug/toxicology screening and will submit to a drug screen upon my providers request to assure I am only taking the prescribed drugs for my safety monitoring. I understand that a drug screen is a laboratory test in which a sample of my urine, blood or saliva is checked to see what drugs I have been taking. This may entail an observed urine specimen, which means that a nurse or other health care provider may watch me provide urine, and I will cooperate if I am asked to provide an observed specimen.  I understand that my provider will check a copy of my State Prescription Monitoring Program () Report in order to safely prescribe medications.  Pill Counts: I consent to pill counts when requested.   I may be asked to bring all my prescribed controlled substance medications, in their original bottles, to all of my scheduled appointments. In addition, my provider may ask me to come to the practice at any time for a random pill count. 8. TERMINATION OF THIS AGREEMENT  For my safety, my prescriber has the right to stop prescribing controlled substance medications and may end this agreement. Initials:_______   Conditions that may result in termination of this agreement:  a. I do not show any improvement in pain, or my activity has not improved. b. I develop rapid tolerance or loss of improvement, as described in my treatment plan.  c. I develop significant side effects from the medication. d. My behavior is not consistent with the responsibilities outlined above, thereby causing safety concerns to continue prescribing controlled substance medications. e. I fail to follow the terms of this agreement. f. Other:____________________________       UNDERSTANDING THIS MEDICATION AGREEMENT:    I have read the above and have had all my questions answered. For chronic disease management, I know that my symptoms can be managed with many types of treatments. A chronic medication trial may be part of my treatment, but I must be an active participant in my care. Medication therapy is only one part of my symptom management plan. In some cases, there may be limited scientific evidence to support the chronic use of certain medications to improve symptoms and daily function. Furthermore, in certain circumstances, there may be scientific information that suggests that the use of chronic controlled substances may worsen my symptoms and increase my risk of unintentional death directly related to this medication therapy. I know that if my provider feels my risk from controlled medications is greater than my benefit, I will have my controlled substance medication(s) compassionately lowered or removed altogether.      I further agree to allow this office to contact my HIPAA contact if there are concerns about my safety and use of the controlled medications. I have agreed to use the prescribed controlled substance medications to me as instructed by my provider and as stated in this Medication Agreement. My initial on each page and my signature below shows that I have read each page and I have had the opportunity to ask questions with answers provided by my provider.     Patient Name (Printed): _____________________________________  Patient Signature:  ______________________   Date: _____________    Prescriber Name (Printed): ___________________________________  Prescriber Signature: _____________________  Date: _____________

## 2022-04-01 NOTE — PROGRESS NOTES
4/1/2022    Chief Complaint   Patient presents with    Hypertension    Hyperlipidemia     fasting    Back Pain     chronic    Scoliosis       Maximiliano Galvez is a 57 y.o. female, presents today for:      ASSESSMENT/PLAN:  1. Essential hypertension  stable  Continue Lisinopril  Encouraged heart healthy diet  Encouraged 30 min activity daily  Encouraged weight loss  - lisinopril (PRINIVIL;ZESTRIL) 5 MG tablet; TAKE ONE TABLET BY MOUTH DAILY  Dispense: 90 tablet; Refill: 1    2. Hyperlipidemia, unspecified hyperlipidemia type  Labs today  Encouraged cardiac diet  Prior Atorvastatin cause Allopecia    3. Acquired spondylolisthesis  Stable today  Continue care with Dania (Ortho)  Increase Gabapentin today. Continue Celebrex and PRN Lidoderm patches. Recently attempted steroid injections and nerve blocks without success. Considering ablation in the near future. PDMP checked today, UDS obtained, Medication Contract signed. If needing more than 12 Norco will need to go to Pain Mgmt.    - HYDROcodone-acetaminophen (NORCO) 5-325 MG per tablet; Take 1 tablet by mouth every 6 hours as needed for Pain for up to 180 days. Dispense: 12 tablet; Refill: 0  - Drug Panel-PM-HI Res-UR Interp-A  - gabapentin (NEURONTIN) 600 MG tablet; Take 1 tablet by mouth 3 times daily for 90 days. Dispense: 270 tablet; Refill: 1    4. Chronic bilateral low back pain with right-sided sciatica  See above  - HYDROcodone-acetaminophen (NORCO) 5-325 MG per tablet; Take 1 tablet by mouth every 6 hours as needed for Pain for up to 180 days. Dispense: 12 tablet; Refill: 0  - Drug Panel-PM-HI Res-UR Interp-A  - gabapentin (NEURONTIN) 600 MG tablet; Take 1 tablet by mouth 3 times daily for 90 days. Dispense: 270 tablet; Refill: 1    5. Scoliosis of thoracolumbar spine, unspecified scoliosis type  See above  - HYDROcodone-acetaminophen (NORCO) 5-325 MG per tablet; Take 1 tablet by mouth every 6 hours as needed for Pain for up to 180 days. Dispense: 12 tablet; Refill: 0    6. Primary osteoarthritis of both hips  Continue HEP  - celecoxib (CELEBREX) 200 MG capsule; TAKE ONE CAPSULE BY MOUTH DAILY  Dispense: 90 capsule; Refill: 1  - furosemide (LASIX) 20 MG tablet; TAKE ONE TABLET BY MOUTH DAILY  Dispense: 90 tablet; Refill: 1    7. Pseudopolyp of sigmoid colon with complication ([address]  Encouraged to obtain updated [place]. Pt would prefer to not follow up with Dr. Lindsey Briggs- will find another provider. 8. Class 2 obesity due to excess calories without serious comorbidity with body mass index (BMI) of 38.0 to 38.9 in adult  Encouraged weight loss      Return in about 6 months (around 10/1/2022) for fasting, htn/ hld. Presenting today for chronic disease follow up. HTN: No CP, SOB, leg swelling, orthopnea, PNA, hadaches. Taking Lisinopril daily without issue. Taking Furosemide PRN for leg swelling which helps  HLD: Cholesterol continues to be elevated. Noted hair loss after starting Atorvastatin. Wants to attempt diet changes before restarting Crestor. GERD: Doing well today. NO issues with swallowing, no globus sensation. Toelrating Prilosec well. Chronic Back Pain: Continues to occur. Primarily right side lumbar region no change in right sided radiculopathy symptoms. Back pain occurring since 2004, dx scoliosis. No prior injury. S/p 2 injections with [place]- no relief. Considering ablations. Back pain usually located right side, biltareal hips with numbness wrapping around hip and going over anterior right thigh. Prior lumbar x-rays. Mri. No prior pt. Currently on gabapetin with mild relief of pain, requesting to increase. colonoscopy due 2022. Pap due- prefers to go to OB/ GYN    Not planning to obtain COVID vaccination. Prior COVID infection.   Declining Shingrix/ TDaP    Lab Results   Component Value Date    CHOL 255 (H) 10/11/2021    CHOL 234 (H) 11/24/2020    CHOL 129 10/19/2019     Lab Results   Component Value Date    TRIG 69 10/11/2021    TRIG 94 11/24/2020    TRIG 73 10/19/2019     Lab Results   Component Value Date    HDL 62 (H) 10/11/2021    HDL 55 11/24/2020    HDL 44 10/19/2019     Lab Results   Component Value Date    LDLCALC 179 (H) 10/11/2021    LDLCALC 160 (H) 11/24/2020    LDLCALC 70 10/19/2019     Lab Results   Component Value Date    LABVLDL 14 10/11/2021    LABVLDL 19 11/24/2020    LABVLDL 15 10/19/2019     No results found for: [facility]    Lab Results   Component Value Date     (H) 10/11/2021    K 5.0 10/11/2021     10/11/2021    CO2 22 10/11/2021    BUN 22 (H) 10/11/2021    CREATININE 0.9 10/11/2021    GLUCOSE 107 (H) 10/11/2021    CALCIUM 10.9 (H) 10/11/2021    PROT 7.8 10/11/2021    LABALBU 5.0 10/11/2021    BILITOT <0.2 10/11/2021    ALKPHOS 78 10/11/2021    AST 21 10/11/2021    ALT 20 10/11/2021    LABGLOM >60 10/11/2021    GFRAA >60 10/11/2021    AGRATIO 1.8 10/11/2021    GLOB 2.8 10/11/2021     Review of Systems   Constitutional: Negative. Respiratory: Negative for cough, chest tightness and shortness of breath. Cardiovascular: Negative. Gastrointestinal: Negative for blood in stool, constipation and diarrhea. Musculoskeletal: Positive for back pain. Skin: Negative. Neurological: Negative for dizziness, tremors, light-headedness and headaches. Psychiatric/Behavioral: Negative for decreased concentration, dysphoric mood, self-injury, sleep disturbance and suicidal ideas. The patient is not nervous/anxious.         Current Outpatient Medications on File Prior to Visit   Medication Sig Dispense Refill    [place] 1000 MG CAPS Take 1 capsule by mouth daily      cetirizine (ZYRTEC) 10 MG tablet Take 10 mg by mouth daily      Omega-3 Fatty Acids (FISH OIL) 1000 MG CPDR Take 2,000 mg by mouth 2 times daily      Cholecalciferol (VITAMIN D3) 1.25 MG (31540 UT) CAPS Take 3 capsules by mouth in the morning and at bedtime      zinc gluconate 50 MG tablet Take 25 mg by mouth in the morning and at bedtime      magnesium 200 MG TABS tablet Take 200 mg by mouth daily       No current facility-administered medications on file prior to visit. Allergies   Allergen Reactions    Other      Bandage that covered hip caused severeblisters and nerve damage after hip replacement  Bandage that covered hip caused severeblisters and nerve damage after hip replacement      Atorvastatin Other (See Comments)     Alopecia 21  Uterine spasms  Alopecia 21  Uterine spasms      Eggs Or Egg-Derived Products Diarrhea    Levofloxacin In D5w Other (See Comments)     Bladder spasm  Other reaction(s): Other (See Comments)  Bladder spasm  Bladder spasm  Other reaction(s): Other (See Comments)  Bladder spasm      Bactrim Rash    Lamisil [Terbinafine] Rash    Penicillins Other (See Comments) and Rash     Had reaction as a child      Terbinafine Hcl Rash     Past Medical History:   Diagnosis Date    COVID-19     Hypertension     Thoracogenic scoliosis of thoracolumbar region     Unspecified sleep apnea      Past Surgical History:   Procedure Laterality Date    BREAST SURGERY       SECTION      COLONOSCOPY      POLYP    COLONOSCOPY  2017    polyp    CYST REMOVAL      HIP SURGERY Right     hip replacement     KNEE SURGERY Left 2019    knee replacement, [place]    TONSILLECTOMY AND ADENOIDECTOMY      TUBAL LIGATION       Social History     Tobacco Use    Smoking status: Former Smoker     Packs/day: 1.00     Years: 25.00     Pack years: 25.00     Quit date: 2003     Years since quittin.8    Smokeless tobacco: Never Used   Substance Use Topics    Alcohol use: Yes     Alcohol/week: 0.0 standard drinks     Comment: occasionally     No family history on file.     Vitals:    22 0802   BP: 128/82   Pulse: 75   Temp: 97 °F (36.1 °C)   TempSrc: Infrared   SpO2: 97%   Weight: 225 lb (102.1 kg)     Estimated body mass index is 38.62 kg/m² as calculated from the following:    Height as of 10/11/21: 5' 4\" (1.626 m). Weight as of this encounter: 225 lb (102.1 kg). Physical Exam  Vitals reviewed. Constitutional:       Appearance: Normal appearance. HENT:      Head: Normocephalic. Neck:      Vascular: No carotid bruit. Cardiovascular:      Rate and Rhythm: Normal rate and regular rhythm. Pulses: Normal pulses. Heart sounds: Normal heart sounds. No murmur heard. No gallop. Pulmonary:      Effort: Pulmonary effort is normal.      Breath sounds: Normal breath sounds. Abdominal:      General: Abdomen is flat. Palpations: Abdomen is soft. Musculoskeletal:      Thoracic back: No swelling, edema, deformity, signs of trauma, lacerations, spasms, tenderness or bony tenderness. Decreased range of motion. Scoliosis present. Lumbar back: Deformity, spasms and tenderness (right) present. No swelling, edema, signs of trauma, lacerations or bony tenderness. Decreased range of motion. Negative right straight leg raise test and negative left straight leg raise test. Scoliosis present. Skin:     General: Skin is warm and dry. Capillary Refill: Capillary refill takes less than 2 seconds. Neurological:      General: No focal deficit present. Mental Status: She is alert and oriented to person, place, and time. Psychiatric:         Mood and Affect: Mood normal.         Behavior: Behavior normal.           Patient's questions answered and concerns addressed. Patient agrees to plan of care.         Electronically signed by ALKA Brand CNP on 4/1/2022 at 8:35 AM

## 2022-04-06 LAB
6-ACETYLMORPHINE: NOT DETECTED
7-AMINOCLONAZEPAM: NOT DETECTED
ALPHA-OH-ALPRAZOLAM: NOT DETECTED
ALPHA-OH-MIDAZOLAM, URINE: NOT DETECTED
ALPRAZOLAM: NOT DETECTED
AMPHETAMINE: NOT DETECTED
BARBITURATES: NOT DETECTED
BENZOYLECGONINE: NOT DETECTED
BUPRENORPHINE: NOT DETECTED
CARISOPRODOL: NOT DETECTED
CLONAZEPAM: NOT DETECTED
CODEINE: NOT DETECTED
CREATININE URINE: 40.5 MG/DL (ref 20–400)
DIAZEPAM: NOT DETECTED
DRUGS EXPECTED: NORMAL
EER PAIN MGT DRUG PANEL, HIGH RES/EMIT U: NORMAL
ETHYL GLUCURONIDE: NOT DETECTED
FENTANYL: NOT DETECTED
GABAPENTIN: PRESENT
HYDROCODONE: NOT DETECTED
HYDROMORPHONE: NOT DETECTED
LORAZEPAM: NOT DETECTED
MARIJUANA METABOLITE: NOT DETECTED
MDA: NOT DETECTED
MDEA: NOT DETECTED
MDMA URINE: NOT DETECTED
MEPERIDINE: NOT DETECTED
METHADONE: NOT DETECTED
METHAMPHETAMINE: NOT DETECTED
METHYLPHENIDATE: NOT DETECTED
MIDAZOLAM: NOT DETECTED
MORPHINE: NOT DETECTED
NALOXONE: NOT DETECTED
NORBUPRENORPHINE, FREE: NOT DETECTED
NORDIAZEPAM: NOT DETECTED
NORFENTANYL: NOT DETECTED
NORHYDROCODONE, URINE: NOT DETECTED
NOROXYCODONE: NOT DETECTED
NOROXYMORPHONE, URINE: NOT DETECTED
OXAZEPAM: NOT DETECTED
OXYCODONE: NOT DETECTED
OXYMORPHONE: NOT DETECTED
PAIN MANAGEMENT DRUG PANEL: NORMAL
PAIN MANAGEMENT DRUG PANEL: NORMAL
PCP: NOT DETECTED
PHENTERMINE: NOT DETECTED
PREGABALIN: NOT DETECTED
TAPENTADOL, URINE: NOT DETECTED
TAPENTADOL-O-SULFATE, URINE: NOT DETECTED
TEMAZEPAM: NOT DETECTED
TRAMADOL: NOT DETECTED
ZOLPIDEM: NOT DETECTED

## 2022-05-16 NOTE — PROGRESS NOTES
Subjective:      Patient ID: Anabel Earl is a 61 y.o. female. 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 Nonsuicidal that she has thought about dying and about suicide. No plan. Chief complaint and present illness: 25-year-old white female presents unaccompanied with the above issues    Review of Systems   Gastrointestinal: Negative. Genitourinary: Negative for vaginal discharge and vaginal pain. Psychiatric/Behavioral: Positive for dysphoric mood. The patient is nervous/anxious. Objective:   Physical Exam   Constitutional: She is oriented to person, place, and time. She appears well-developed and well-nourished. Neck: Neck supple. Carotid bruit is not present. Erythema present. Cardiovascular: Normal rate, regular rhythm and normal heart sounds. Pulmonary/Chest: Effort normal and breath sounds normal. She has no wheezes. She has no rales. Musculoskeletal: She exhibits no edema. Lymphadenopathy:     She has no cervical adenopathy. Neurological: She is alert and oriented to person, place, and time. Psychiatric: Her speech is normal. Her mood appears not anxious. She is not agitated. Cognition and memory are normal. She does not express impulsivity. She exhibits a depressed mood. She expresses no suicidal ideation. She expresses no suicidal plans.    Teary at interview   Nursing note and vitals reviewed. BP (!) 140/94   Pulse 75   Ht 5' 4\" (1.626 m)   Wt 262 lb (118.8 kg)   LMP 07/04/2013   SpO2 97%   Breastfeeding? No   BMI 44.97 kg/m²     Assessment:      Abelardo Reyna was seen today for hypertension, arthritis, vaginal itching and depression. Diagnoses and all orders for this visit:    Essential hypertension  -     lisinopril (PRINIVIL;ZESTRIL) 5 MG tablet; TAKE ONE TABLET BY MOUTH DAILY  -     hydrochlorothiazide (HYDRODIURIL) 25 MG tablet; TAKE 1 TABLET BY MOUTH DAILY. Primary osteoarthritis of both hips  -     meloxicam (MOBIC) 15 MG tablet; TAKE 1 TABLET BY MOUTH DAILY  -     gabapentin (NEURONTIN) 300 MG capsule; Take 1 capsule by mouth 3 times daily  -     HYDROcodone-acetaminophen (NORCO) 5-325 MG per tablet; 1 po q8h prn pain and before exercising. Other acute gastritis without hemorrhage  -     omeprazole (PRILOSEC OTC) 20 MG tablet; Take 1 tablet by mouth daily    Yeast vaginitis  -     miconazole (MONISTAT 3 COMBINATION PACK) 200 & 2 MG-% (9GM) KIT kit; Nightly times 3    Need for immunization against influenza  -     INFLUENZA, QUADV, 3 YRS AND OLDER, IM, MDV, 0.5ML (FLUZONE QUADV)    Other depression  -     FLUoxetine (PROZAC) 20 MG capsule; Take 1 capsule by mouth daily           Plan:      .tariw  Return in about 4 weeks (around 11/13/2017). There are no Patient Instructions on file for this visit. Detail Level: Detailed Detail Level: Zone Isotretinoin Counseling: Patient should get monthly blood tests, not donate blood, not drive at night if vision affected, not share medication, and not undergo elective surgery for 6 months after tx completed. Side effects reviewed, pt to contact office should one occur. Doxycycline Counseling:  Patient counseled regarding possible photosensitivity and increased risk for sunburn. Patient instructed to avoid sunlight, if possible. When exposed to sunlight, patients should wear protective clothing, sunglasses, and sunscreen. The patient was instructed to call the office immediately if the following severe adverse effects occur:  hearing changes, easy bruising/bleeding, severe headache, or vision changes. The patient verbalized understanding of the proper use and possible adverse effects of doxycycline. All of the patient's questions and concerns were addressed. Benzoyl Peroxide Counseling: Patient counseled that medicine may cause skin irritation and bleach clothing. In the event of skin irritation, the patient was advised to reduce the amount of the drug applied or use it less frequently. The patient verbalized understanding of the proper use and possible adverse effects of benzoyl peroxide. All of the patient's questions and concerns were addressed. Spironolactone Counseling: Patient advised regarding risks of diarrhea, abdominal pain, hyperkalemia, birth defects (for female patients), liver toxicity and renal toxicity. The patient may need blood work to monitor liver and kidney function and potassium levels while on therapy. The patient verbalized understanding of the proper use and possible adverse effects of spironolactone. All of the patient's questions and concerns were addressed. Minocycline Counseling: Patient advised regarding possible photosensitivity and discoloration of the teeth, skin, lips, tongue and gums. Patient instructed to avoid sunlight, if possible. When exposed to sunlight, patients should wear protective clothing, sunglasses, and sunscreen. The patient was instructed to call the office immediately if the following severe adverse effects occur:  hearing changes, easy bruising/bleeding, severe headache, or vision changes. The patient verbalized understanding of the proper use and possible adverse effects of minocycline. All of the patient's questions and concerns were addressed. Topical Sulfur Applications Counseling: Topical Sulfur Counseling: Patient counseled that this medication may cause skin irritation or allergic reactions. In the event of skin irritation, the patient was advised to reduce the amount of the drug applied or use it less frequently. The patient verbalized understanding of the proper use and possible adverse effects of topical sulfur application. All of the patient's questions and concerns were addressed. Azithromycin Pregnancy And Lactation Text: This medication is considered safe during pregnancy and is also secreted in breast milk. Tazorac Counseling:  Patient advised that medication is irritating and drying. Patient may need to apply sparingly and wash off after an hour before eventually leaving it on overnight. The patient verbalized understanding of the proper use and possible adverse effects of tazorac. All of the patient's questions and concerns were addressed. Doxycycline Pregnancy And Lactation Text: This medication is Pregnancy Category D and not consider safe during pregnancy. It is also excreted in breast milk but is considered safe for shorter treatment courses. Use Enhanced Medication Counseling?: Yes Birth Control Pills Pregnancy And Lactation Text: This medication should be avoided if pregnant and for the first 30 days post-partum. Winlevi Counseling:  I discussed with the patient the risks of topical clascoterone including but not limited to erythema, scaling, itching, and stinging. Patient voiced their understanding. Spironolactone Pregnancy And Lactation Text: This medication can cause feminization of the male fetus and should be avoided during pregnancy. The active metabolite is also found in breast milk. Minocycline Pregnancy And Lactation Text: This medication is Pregnancy Category D and not consider safe during pregnancy. It is also excreted in breast milk. Isotretinoin Pregnancy And Lactation Text: This medication is Pregnancy Category X and is considered extremely dangerous during pregnancy. It is unknown if it is excreted in breast milk. Tazorac Pregnancy And Lactation Text: This medication is not safe during pregnancy. It is unknown if this medication is excreted in breast milk. Benzoyl Peroxide Pregnancy And Lactation Text: This medication is Pregnancy Category C. It is unknown if benzoyl peroxide is excreted in breast milk. Dapsone Counseling: I discussed with the patient the risks of dapsone including but not limited to hemolytic anemia, agranulocytosis, rashes, methemoglobinemia, kidney failure, peripheral neuropathy, headaches, GI upset, and liver toxicity. Patients who start dapsone require monitoring including baseline LFTs and weekly CBCs for the first month, then every month thereafter. The patient verbalized understanding of the proper use and possible adverse effects of dapsone. All of the patient's questions and concerns were addressed. Winlevi Pregnancy And Lactation Text: This medication is considered safe during pregnancy and breastfeeding. Topical Sulfur Applications Pregnancy And Lactation Text: This medication is Pregnancy Category C and has an unknown safety profile during pregnancy. It is unknown if this topical medication is excreted in breast milk. Bactrim Counseling:  I discussed with the patient the risks of sulfa antibiotics including but not limited to GI upset, allergic reaction, drug rash, diarrhea, dizziness, photosensitivity, and yeast infections. Rarely, more serious reactions can occur including but not limited to aplastic anemia, agranulocytosis, methemoglobinemia, blood dyscrasias, liver or kidney failure, lung infiltrates or desquamative/blistering drug rashes. Erythromycin Counseling:  I discussed with the patient the risks of erythromycin including but not limited to GI upset, allergic reaction, drug rash, diarrhea, increase in liver enzymes, and yeast infections. Include Pregnancy/Lactation Warning?: No Tetracycline Counseling: Patient counseled regarding possible photosensitivity and increased risk for sunburn. Patient instructed to avoid sunlight, if possible. When exposed to sunlight, patients should wear protective clothing, sunglasses, and sunscreen. The patient was instructed to call the office immediately if the following severe adverse effects occur:  hearing changes, easy bruising/bleeding, severe headache, or vision changes. The patient verbalized understanding of the proper use and possible adverse effects of tetracycline. All of the patient's questions and concerns were addressed. Patient understands to avoid pregnancy while on therapy due to potential birth defects. Sarecycline Counseling: Patient advised regarding possible photosensitivity and discoloration of the teeth, skin, lips, tongue and gums. Patient instructed to avoid sunlight, if possible. When exposed to sunlight, patients should wear protective clothing, sunglasses, and sunscreen. The patient was instructed to call the office immediately if the following severe adverse effects occur:  hearing changes, easy bruising/bleeding, severe headache, or vision changes. The patient verbalized understanding of the proper use and possible adverse effects of sarecycline. All of the patient's questions and concerns were addressed. High Dose Vitamin A Counseling: Side effects reviewed, pt to contact office should one occur. Azelaic Acid Counseling: Patient counseled that medicine may cause skin irritation and to avoid applying near the eyes. In the event of skin irritation, the patient was advised to reduce the amount of the drug applied or use it less frequently. The patient verbalized understanding of the proper use and possible adverse effects of azelaic acid. All of the patient's questions and concerns were addressed. Topical Clindamycin Counseling: Patient counseled that this medication may cause skin irritation or allergic reactions. In the event of skin irritation, the patient was advised to reduce the amount of the drug applied or use it less frequently. The patient verbalized understanding of the proper use and possible adverse effects of clindamycin. All of the patient's questions and concerns were addressed. Bactrim Pregnancy And Lactation Text: This medication is Pregnancy Category D and is known to cause fetal risk. It is also excreted in breast milk. Topical Retinoid counseling:  Patient advised to apply a pea-sized amount only at bedtime and wait 30 minutes after washing their face before applying. If too drying, patient may add a non-comedogenic moisturizer. The patient verbalized understanding of the proper use and possible adverse effects of retinoids. All of the patient's questions and concerns were addressed. Erythromycin Pregnancy And Lactation Text: This medication is Pregnancy Category B and is considered safe during pregnancy. It is also excreted in breast milk. Dapsone Pregnancy And Lactation Text: This medication is Pregnancy Category C and is not considered safe during pregnancy or breast feeding. High Dose Vitamin A Pregnancy And Lactation Text: High dose vitamin A therapy is contraindicated during pregnancy and breast feeding. Topical Clindamycin Pregnancy And Lactation Text: This medication is Pregnancy Category B and is considered safe during pregnancy. It is unknown if it is excreted in breast milk. Topical Retinoid Pregnancy And Lactation Text: This medication is Pregnancy Category C. It is unknown if this medication is excreted in breast milk. Birth Control Pills Counseling: Birth Control Pill Counseling: I discussed with the patient the potential side effects of OCPs including but not limited to increased risk of stroke, heart attack, thrombophlebitis, deep venous thrombosis, hepatic adenomas, breast changes, GI upset, headaches, and depression. The patient verbalized understanding of the proper use and possible adverse effects of OCPs. All of the patient's questions and concerns were addressed. Azelaic Acid Pregnancy And Lactation Text: This medication is considered safe during pregnancy and breast feeding. Azithromycin Counseling:  I discussed with the patient the risks of azithromycin including but not limited to GI upset, allergic reaction, drug rash, diarrhea, and yeast infections.

## 2022-08-05 ENCOUNTER — TELEMEDICINE (OUTPATIENT)
Dept: FAMILY MEDICINE CLINIC | Age: 62
End: 2022-08-05
Payer: COMMERCIAL

## 2022-08-05 ENCOUNTER — TELEPHONE (OUTPATIENT)
Dept: FAMILY MEDICINE CLINIC | Age: 62
End: 2022-08-05

## 2022-08-05 DIAGNOSIS — R05.9 COUGH: ICD-10-CM

## 2022-08-05 DIAGNOSIS — U07.1 COVID-19: ICD-10-CM

## 2022-08-05 DIAGNOSIS — J02.9 ACUTE PHARYNGITIS, UNSPECIFIED ETIOLOGY: Primary | ICD-10-CM

## 2022-08-05 PROCEDURE — 99213 OFFICE O/P EST LOW 20 MIN: CPT

## 2022-08-05 RX ORDER — DEXTROMETHORPHAN HYDROBROMIDE AND PROMETHAZINE HYDROCHLORIDE 15; 6.25 MG/5ML; MG/5ML
5 SYRUP ORAL 4 TIMES DAILY PRN
Qty: 150 ML | Refills: 0 | Status: SHIPPED | OUTPATIENT
Start: 2022-08-05 | End: 2022-08-12

## 2022-08-05 RX ORDER — DOXYCYCLINE HYCLATE 100 MG
100 TABLET ORAL 2 TIMES DAILY
Qty: 14 TABLET | Refills: 0 | Status: SHIPPED | OUTPATIENT
Start: 2022-08-05 | End: 2022-08-12

## 2022-08-05 RX ORDER — BENZONATATE 200 MG/1
200 CAPSULE ORAL 3 TIMES DAILY PRN
Qty: 30 CAPSULE | Refills: 0 | Status: SHIPPED | OUTPATIENT
Start: 2022-08-05 | End: 2022-08-12

## 2022-08-05 ASSESSMENT — ENCOUNTER SYMPTOMS
ABDOMINAL PAIN: 0
WHEEZING: 0
HEMOPTYSIS: 0
SHORTNESS OF BREATH: 1
COUGH: 1
SORE THROAT: 1

## 2022-08-05 NOTE — TELEPHONE ENCOUNTER
Patient emailed pictures of covid test, they are positive. Would like paxlovid sent to [id number], they do have in stock.

## 2022-08-05 NOTE — TELEPHONE ENCOUNTER
----- Message from Maryana Allen sent at 8/5/2022  3:15 PM EDT -----  Subject: Message to Provider    QUESTIONS  Information for Provider? Pt called and stated that she was told to take a   COVID test. Pt states that she took the test and that it came back   invalid, so she is taking it that she does not have COVID.   ---------------------------------------------------------------------------  --------------  Yoav Diaz INFO  [phone]; OK to leave message on voicemail  ---------------------------------------------------------------------------  --------------  SCRIPT ANSWERS  Relationship to Patient?  Self

## 2022-08-05 NOTE — TELEPHONE ENCOUNTER
Patient states both tests that came in the package were invalid results. States she is not spending another $17 dollars because as far as she is concerned she has strep and isn't worried about covid.

## 2022-08-05 NOTE — PROGRESS NOTES
Leida Chaidez (:  1960) is a Established patient, here for evaluation of the following:    Assessment & Plan       Below is the assessment and plan developed based on review of pertinent history, physical exam, labs, studies, and medications. 1. Acute pharyngitis, unspecified etiology  -She is allergic to penicillins. We will prescribe Doxy for further bacterial coverage  -Test for COVID at home  -     doxycycline hyclate (VIBRA-TABS) 100 MG tablet; Take 1 tablet by mouth in the morning and 1 tablet before bedtime. Do all this for 7 days. , Disp-14 tablet, R-0Normal  -     COVID-19; Future  2. Cough    -     benzonatate (TESSALON) 200 MG capsule; Take 1 capsule by mouth 3 times daily as needed for Cough, Disp-30 capsule, R-0Normal  -     COVID-19; Future  -     promethazine-dextromethorphan (PROMETHAZINE-DM) 6.25-15 MG/5ML syrup; Take 5 mLs by mouth 4 times daily as needed for Cough, Disp-150 mL, R-0Normal      3. COVID -19  -Positive COVID test.  -[facility]  Return if symptoms worsen or fail to improve. Subjective     Patient seen today for chief complaint of worsening sore throat. She was prescribed Z-Temo on . She states that she has not had any relief with symptoms. .  She has allergic response to penicillins. She has been using cough drops at home with no relief. She does state that she wears CPAP at night. She has been unable to complete cleaning of CPAP over the past 3 days. Denies wanting to get a chest x-ray to rule out pneumonia. Denies any fever. She does report chills and body aches. He does report some shortness of breath. She has not tested for COVID or strep. She states that she has been vaccinated for COVID. Pharyngitis  This is a new problem. The current episode started in the past 7 days. The problem occurs constantly. The problem has been gradually worsening. Associated symptoms include chills, congestion, coughing and a sore throat. Pertinent negatives include no abdominal pain, anorexia or chest pain. The symptoms are aggravated by swallowing and coughing. Treatments tried: z saloni, cpugh drops. The treatment provided no relief. Generalized Body Aches  This is a new problem. The current episode started in the past 7 days. The problem has been gradually improving. Associated symptoms include chills, congestion, coughing and a sore throat. Pertinent negatives include no abdominal pain, anorexia or chest pain. Nothing aggravates the symptoms. She has tried nothing for the symptoms. Cough  This is a new problem. The current episode started in the past 7 days. The cough is Non-productive. Associated symptoms include chills, a sore throat and shortness of breath. Pertinent negatives include no chest pain, hemoptysis or wheezing. The symptoms are aggravated by lying down. She has tried a beta-agonist inhaler for the symptoms. The treatment provided mild relief. There is no history of asthma or COPD. Review of Systems   Constitutional:  Positive for chills. HENT:  Positive for congestion and sore throat. Respiratory:  Positive for cough and shortness of breath. Negative for hemoptysis and wheezing. Cardiovascular:  Negative for chest pain. Gastrointestinal:  Negative for abdominal pain and anorexia. Objective   Patient-Reported Vitals  No data recorded     Physical Exam         On this date 8/5/2022 I have spent 20 minutes reviewing previous notes, test results and face to face (virtual) with the patient discussing the diagnosis and importance of compliance with the treatment plan as well as documenting on the day of the visit. Matthias Hamilton, was evaluated through a synchronous (real-time) audio-video encounter. The patient (or guardian if applicable) is aware that this is a billable service, which includes applicable co-pays. This Virtual Visit was conducted with patient's (and/or legal guardian's) consent.  The visit was conducted pursuant to the emergency declaration under the [facility], [facility] authority and the [employer] Act. Patient identification was verified, and a caregiver was present when appropriate. The patient was located at Home: [address] [place] [id number]. Provider was located at [facility] (Appt Dept): [address] ([facility]),  [address] [facility].         --ALKA Carroll - CNP

## 2022-08-05 NOTE — TELEPHONE ENCOUNTER
I would prefer her to either have a negative or positive result.   Invalid means that the test most likely was defective

## 2022-09-23 ENCOUNTER — OFFICE VISIT (OUTPATIENT)
Dept: FAMILY MEDICINE CLINIC | Age: 62
End: 2022-09-23
Payer: COMMERCIAL

## 2022-09-23 VITALS
TEMPERATURE: 97.8 F | WEIGHT: 227 LBS | SYSTOLIC BLOOD PRESSURE: 122 MMHG | DIASTOLIC BLOOD PRESSURE: 78 MMHG | BODY MASS INDEX: 38.96 KG/M2 | OXYGEN SATURATION: 97 % | HEART RATE: 70 BPM

## 2022-09-23 DIAGNOSIS — M41.9 SCOLIOSIS OF THORACOLUMBAR SPINE, UNSPECIFIED SCOLIOSIS TYPE: ICD-10-CM

## 2022-09-23 DIAGNOSIS — M54.41 CHRONIC BILATERAL LOW BACK PAIN WITH RIGHT-SIDED SCIATICA: ICD-10-CM

## 2022-09-23 DIAGNOSIS — K51.419 PSEUDOPOLYP OF SIGMOID COLON WITH COMPLICATION (HCC): ICD-10-CM

## 2022-09-23 DIAGNOSIS — R73.01 IMPAIRED FASTING GLUCOSE: ICD-10-CM

## 2022-09-23 DIAGNOSIS — M16.0 PRIMARY OSTEOARTHRITIS OF BOTH HIPS: ICD-10-CM

## 2022-09-23 DIAGNOSIS — G89.29 CHRONIC BILATERAL LOW BACK PAIN WITH RIGHT-SIDED SCIATICA: ICD-10-CM

## 2022-09-23 DIAGNOSIS — E78.5 HYPERLIPIDEMIA, UNSPECIFIED HYPERLIPIDEMIA TYPE: ICD-10-CM

## 2022-09-23 DIAGNOSIS — M43.10 ACQUIRED SPONDYLOLISTHESIS: ICD-10-CM

## 2022-09-23 DIAGNOSIS — I10 ESSENTIAL HYPERTENSION: Primary | ICD-10-CM

## 2022-09-23 LAB
A/G RATIO: 1.8 (ref 1.1–2.2)
ALBUMIN SERPL-MCNC: 5 G/DL (ref 3.4–5)
ALP BLD-CCNC: 79 U/L (ref 40–129)
ALT SERPL-CCNC: 26 U/L (ref 10–40)
ANION GAP SERPL CALCULATED.3IONS-SCNC: 14 MMOL/L (ref 3–16)
AST SERPL-CCNC: 22 U/L (ref 15–37)
BILIRUB SERPL-MCNC: 0.3 MG/DL (ref 0–1)
BUN BLDV-MCNC: 22 MG/DL (ref 7–20)
CALCIUM SERPL-MCNC: 10.6 MG/DL (ref 8.3–10.6)
CHLORIDE BLD-SCNC: 100 MMOL/L (ref 99–110)
CHOLESTEROL, FASTING: 270 MG/DL (ref 0–199)
CO2: 23 MMOL/L (ref 21–32)
CREAT SERPL-MCNC: 0.8 MG/DL (ref 0.6–1.2)
GFR AFRICAN AMERICAN: >60
GFR NON-AFRICAN AMERICAN: >60
GLUCOSE BLD-MCNC: 106 MG/DL (ref 70–99)
HDLC SERPL-MCNC: 57 MG/DL (ref 40–60)
LDL CHOLESTEROL CALCULATED: 190 MG/DL
POTASSIUM SERPL-SCNC: 4.1 MMOL/L (ref 3.5–5.1)
SODIUM BLD-SCNC: 137 MMOL/L (ref 136–145)
TOTAL PROTEIN: 7.8 G/DL (ref 6.4–8.2)
TRIGLYCERIDE, FASTING: 113 MG/DL (ref 0–150)
VLDLC SERPL CALC-MCNC: 23 MG/DL

## 2022-09-23 PROCEDURE — 99214 OFFICE O/P EST MOD 30 MIN: CPT | Performed by: NURSE PRACTITIONER

## 2022-09-23 PROCEDURE — 36415 COLL VENOUS BLD VENIPUNCTURE: CPT | Performed by: NURSE PRACTITIONER

## 2022-09-23 RX ORDER — HYDROCODONE BITARTRATE AND ACETAMINOPHEN 5; 325 MG/1; MG/1
1 TABLET ORAL EVERY 6 HOURS PRN
Qty: 12 TABLET | Refills: 0 | Status: SHIPPED | OUTPATIENT
Start: 2022-09-23 | End: 2023-03-22

## 2022-09-23 RX ORDER — CELECOXIB 200 MG/1
CAPSULE ORAL
Qty: 90 CAPSULE | Refills: 3 | Status: SHIPPED | OUTPATIENT
Start: 2022-09-23

## 2022-09-23 RX ORDER — GABAPENTIN 600 MG/1
300 TABLET ORAL 3 TIMES DAILY
Qty: 90 TABLET | Refills: 5 | Status: CANCELLED | OUTPATIENT
Start: 2022-09-23 | End: 2022-12-22

## 2022-09-23 RX ORDER — AMITRIPTYLINE HYDROCHLORIDE 25 MG/1
25 TABLET, FILM COATED ORAL NIGHTLY
Qty: 90 TABLET | Refills: 1 | Status: SHIPPED | OUTPATIENT
Start: 2022-09-23 | End: 2022-12-22

## 2022-09-23 RX ORDER — LISINOPRIL 5 MG/1
TABLET ORAL
Qty: 90 TABLET | Refills: 3 | Status: SHIPPED | OUTPATIENT
Start: 2022-09-23

## 2022-09-23 RX ORDER — FUROSEMIDE 20 MG/1
TABLET ORAL
Qty: 90 TABLET | Refills: 3 | Status: SHIPPED | OUTPATIENT
Start: 2022-09-23

## 2022-09-23 ASSESSMENT — PATIENT HEALTH QUESTIONNAIRE - PHQ9
SUM OF ALL RESPONSES TO PHQ QUESTIONS 1-9: 10
6. FEELING BAD ABOUT YOURSELF - OR THAT YOU ARE A FAILURE OR HAVE LET YOURSELF OR YOUR FAMILY DOWN: 1
4. FEELING TIRED OR HAVING LITTLE ENERGY: 1
SUM OF ALL RESPONSES TO PHQ QUESTIONS 1-9: 9
5. POOR APPETITE OR OVEREATING: 1
2. FEELING DOWN, DEPRESSED OR HOPELESS: 1
SUM OF ALL RESPONSES TO PHQ QUESTIONS 1-9: 10
3. TROUBLE FALLING OR STAYING ASLEEP: 2
SUM OF ALL RESPONSES TO PHQ9 QUESTIONS 1 & 2: 2
7. TROUBLE CONCENTRATING ON THINGS, SUCH AS READING THE NEWSPAPER OR WATCHING TELEVISION: 1
8. MOVING OR SPEAKING SO SLOWLY THAT OTHER PEOPLE COULD HAVE NOTICED. OR THE OPPOSITE, BEING SO FIGETY OR RESTLESS THAT YOU HAVE BEEN MOVING AROUND A LOT MORE THAN USUAL: 1
SUM OF ALL RESPONSES TO PHQ QUESTIONS 1-9: 10
1. LITTLE INTEREST OR PLEASURE IN DOING THINGS: 1
9. THOUGHTS THAT YOU WOULD BE BETTER OFF DEAD, OR OF HURTING YOURSELF: 1
10. IF YOU CHECKED OFF ANY PROBLEMS, HOW DIFFICULT HAVE THESE PROBLEMS MADE IT FOR YOU TO DO YOUR WORK, TAKE CARE OF THINGS AT HOME, OR GET ALONG WITH OTHER PEOPLE: 1

## 2022-09-23 ASSESSMENT — COLUMBIA-SUICIDE SEVERITY RATING SCALE - C-SSRS
2. HAVE YOU ACTUALLY HAD ANY THOUGHTS OF KILLING YOURSELF?: NO
1. WITHIN THE PAST MONTH, HAVE YOU WISHED YOU WERE DEAD OR WISHED YOU COULD GO TO SLEEP AND NOT WAKE UP?: NO
6. HAVE YOU EVER DONE ANYTHING, STARTED TO DO ANYTHING, OR PREPARED TO DO ANYTHING TO END YOUR LIFE?: NO

## 2022-09-23 ASSESSMENT — ENCOUNTER SYMPTOMS
DIARRHEA: 0
CONSTIPATION: 0
SHORTNESS OF BREATH: 0
BACK PAIN: 1
CHEST TIGHTNESS: 0
BLOOD IN STOOL: 0
COUGH: 0

## 2022-09-23 NOTE — PROGRESS NOTES
9/23/2022    Chief Complaint   Patient presents with    Hypertension     Fasting     Arthritis    Back Pain     Still having problems with this        Kenia Anton is a 57 y.o. female, presents today for:      ASSESSMENT/PLAN:  1. Essential hypertension  stable  Continue Lisinopril  Encouraged heart healthy diet  Encouraged 30 min activity daily  Encouraged weight loss  - lisinopril (PRINIVIL;ZESTRIL) 5 MG tablet; TAKE ONE TABLET BY MOUTH DAILY blood pressure  Dispense: 90 tablet; Refill: 3  - CBC with Auto Differential  - Comprehensive Metabolic Panel    2. Impaired fasting glucose  Updated labs today  Encouraged diet/ exercise chagnes. - Hemoglobin A1C    3. Hyperlipidemia, unspecified hyperlipidemia type  Labs today  Encouraged cardiac diet  Prior Atorvastatin cause Allopecia 2020  ASCVD 9/2022 5.5%  - Lipid, Fasting    4. Chronic bilateral low back pain with right-sided sciatica  Trial Amitriptyline. Continue care with [facility] (Ortho)  Prior Meds: Gabapentin- \"felt like I was in a daze\", will need lower doses if she needs again. Continue Celebrex and PRN Lidoderm patches. 2021 PAULETTE and nerve blocks without success. Considering ablation in the near future. Intrmittent use of Poke berries for back pain- feels much better than Celebrex. PDMP checked today, UDS obtained 4/2022, Medication Contract signed. If needing more than 12 Norco will need to go to Pain Mgmt. - amitriptyline (ELAVIL) 25 MG tablet; Take 1 tablet by mouth nightly Back pain  Dispense: 90 tablet; Refill: 1  - HYDROcodone-acetaminophen (NORCO) 5-325 MG per tablet; Take 1 tablet by mouth every 6 hours as needed for Pain for up to 180 days. Dispense: 12 tablet; Refill: 0    5. Acquired spondylolisthesis  See above  - HYDROcodone-acetaminophen (NORCO) 5-325 MG per tablet; Take 1 tablet by mouth every 6 hours as needed for Pain for up to 180 days. Dispense: 12 tablet; Refill: 0    6.  Scoliosis of thoracolumbar spine, unspecified scoliosis type  See above  - HYDROcodone-acetaminophen (NORCO) 5-325 MG per tablet; Take 1 tablet by mouth every 6 hours as needed for Pain for up to 180 days. Dispense: 12 tablet; Refill: 0    7. Primary osteoarthritis of both hips  See above  Continue Celebrex  - furosemide (LASIX) 20 MG tablet; TAKE ONE TABLET BY MOUTH DAILY  Dispense: 90 tablet; Refill: 3  - celecoxib (CELEBREX) 200 MG capsule; TAKE ONE CAPSULE BY MOUTH DAILY pain  Dispense: 90 capsule; Refill: 3    8. Pseudopolyp of sigmoid colon with complication [facility])  Encouraged updated Colonoscopy. Return in about 4 weeks (around 10/21/2022) for VV/ phone 4 weeks Amitriptyline, then 6 months for regular appt. Presenting today for chronic disease follow up. HTN: No CP, SOB, leg swelling, orthopnea, PNA, hadaches. Taking Lisinopril daily without issue. Taking Furosemide PRN for leg swelling which helps  HLD: Cholesterol continues to be elevated. Noted hair loss after starting Atorvastatin. Wants to attempt diet changes before restarting Crestor. GERD: Doing well today. NO issues with swallowing, no globus sensation. Toelrating Prilosec well. Chronic Back Pain: Continues to occur. Primarily right side lumbar region no change in right sided radiculopathy symptoms. Back pain occurring since 2004, dx scoliosis. No prior injury. S/p 2 injections with Dorena- no relief. Considering ablations. Back pain usually located right side, biltareal hips with numbness wrapping around hip and going over anterior right thigh. Prior lumbar x-rays. Mri. No prior pt. Currently on gabapetin with mild relief of pain, requesting to increase. colonoscopy due 2022. Pap due- prefers to go to OB/ GYN    Not planning to obtain COVID vaccination. Prior COVID infection.   Declining Shingrix/ TDaP    PHQ Scores 4/1/2022 4/7/2021 10/30/2020 2/25/2019 3/14/2018 3/3/2017   PHQ2 Score 0 1 0 0 0 0   PHQ9 Score 0 1 0 0 0 0     Interpretation of Total Score Depression Severity: 1-4 = Minimal depression, 5-9 = Mild depression, 10-14 = Moderate depression, 15-19 = Moderately severe depression, 20-27 = Severe depression      Lab Results   Component Value Date    CHOL 255 (H) 10/11/2021    CHOL 234 (H) 11/24/2020    CHOL 129 10/19/2019     Lab Results   Component Value Date    TRIG 69 10/11/2021    TRIG 94 11/24/2020    TRIG 73 10/19/2019     Lab Results   Component Value Date    HDL 62 (H) 10/11/2021    HDL 55 11/24/2020    HDL 44 10/19/2019     Lab Results   Component Value Date    LDLCALC 179 (H) 10/11/2021    LDLCALC 160 (H) 11/24/2020    LDLCALC 70 10/19/2019     Lab Results   Component Value Date    LABVLDL 14 10/11/2021    LABVLDL 19 11/24/2020    LABVLDL 15 10/19/2019     No results found for: [facility]    Lab Results   Component Value Date     (H) 10/11/2021    K 5.0 10/11/2021     10/11/2021    CO2 22 10/11/2021    BUN 22 (H) 10/11/2021    CREATININE 0.9 10/11/2021    GLUCOSE 107 (H) 10/11/2021    CALCIUM 10.9 (H) 10/11/2021    PROT 7.8 10/11/2021    LABALBU 5.0 10/11/2021    BILITOT <0.2 10/11/2021    ALKPHOS 78 10/11/2021    AST 21 10/11/2021    ALT 20 10/11/2021    LABGLOM >60 10/11/2021    GFRAA >60 10/11/2021    AGRATIO 1.8 10/11/2021    GLOB 2.8 10/11/2021     Review of Systems   Constitutional: Negative. Respiratory:  Negative for cough, chest tightness and shortness of breath. Cardiovascular: Negative. Gastrointestinal:  Negative for blood in stool, constipation and diarrhea. Musculoskeletal:  Positive for back pain. Skin: Negative. Neurological:  Negative for dizziness, tremors, light-headedness and headaches. Psychiatric/Behavioral:  Negative for decreased concentration, dysphoric mood, self-injury, sleep disturbance and suicidal ideas. The patient is not nervous/anxious.       Current Outpatient Medications on File Prior to Visit   Medication Sig Dispense Refill    Saw [place] 1000 MG CAPS Take 1 capsule by mouth daily cetirizine (ZYRTEC) 10 MG tablet Take 10 mg by mouth daily      Omega-3 Fatty Acids (FISH OIL) 1000 MG CPDR Take 2,000 mg by mouth 2 times daily      Cholecalciferol (VITAMIN D3) 1.25 MG (25219 UT) CAPS Take 3 capsules by mouth daily      zinc gluconate 50 MG tablet Take 25 mg by mouth in the morning and at bedtime      magnesium 200 MG TABS tablet Take 200 mg by mouth daily      gabapentin (NEURONTIN) 600 MG tablet Take 1 tablet by mouth 3 times daily for 90 days. (Patient taking differently: Take 300 mg by mouth 3 times daily.) 270 tablet 1     No current facility-administered medications on file prior to visit. Allergies   Allergen Reactions    Other      Bandage that covered hip caused severeblisters and nerve damage after hip replacement  Bandage that covered hip caused severeblisters and nerve damage after hip replacement      Atorvastatin Other (See Comments)     Alopecia 21  Uterine spasms  Alopecia 21  Uterine spasms      Eggs Or Egg-Derived Products Diarrhea    Levofloxacin In D5w Other (See Comments)     Bladder spasm  Other reaction(s): Other (See Comments)  Bladder spasm  Bladder spasm  Other reaction(s):  Other (See Comments)  Bladder spasm      Bactrim Rash    Lamisil [Terbinafine] Rash    Penicillins Other (See Comments) and Rash     Had reaction as a child      Terbinafine Hcl Rash     Past Medical History:   Diagnosis Date    COVID-19     Hypertension     Thoracogenic scoliosis of thoracolumbar region     Unspecified sleep apnea      Past Surgical History:   Procedure Laterality Date    BREAST SURGERY       SECTION      COLONOSCOPY      POLYP    COLONOSCOPY  2017    polyp    CYST REMOVAL      HIP SURGERY Right     hip replacement     KNEE SURGERY Left 2019    knee replacement, [place]    TONSILLECTOMY AND ADENOIDECTOMY      TUBAL LIGATION       Social History     Tobacco Use    Smoking status: Former     Packs/day: 1.00     Years: 25.00     Pack years: 25.00 Types: Cigarettes     Quit date: 2003     Years since quittin.2    Smokeless tobacco: Never   Substance Use Topics    Alcohol use: Yes     Alcohol/week: 0.0 standard drinks     Comment: occasionally     No family history on file. Vitals:    22 0707   BP: 122/78   Pulse: 70   Temp: 97.8 °F (36.6 °C)   TempSrc: Infrared   SpO2: 97%   Weight: 227 lb (103 kg)     Estimated body mass index is 38.96 kg/m² as calculated from the following:    Height as of 10/11/21: 5' 4\" (1.626 m). Weight as of this encounter: 227 lb (103 kg). Physical Exam  Vitals reviewed. Constitutional:       Appearance: Normal appearance. HENT:      Head: Normocephalic. Neck:      Vascular: No carotid bruit. Cardiovascular:      Rate and Rhythm: Normal rate and regular rhythm. Pulses: Normal pulses. Heart sounds: Normal heart sounds. No murmur heard. No gallop. Pulmonary:      Effort: Pulmonary effort is normal.      Breath sounds: Normal breath sounds. Abdominal:      General: Abdomen is flat. Palpations: Abdomen is soft. Musculoskeletal:      Thoracic back: No swelling, edema, deformity, signs of trauma, lacerations, spasms, tenderness or bony tenderness. Decreased range of motion. Scoliosis present. Lumbar back: Deformity, spasms and tenderness (right) present. No swelling, edema, signs of trauma, lacerations or bony tenderness. Decreased range of motion. Negative right straight leg raise test and negative left straight leg raise test. Scoliosis present. Skin:     General: Skin is warm and dry. Capillary Refill: Capillary refill takes less than 2 seconds. Neurological:      General: No focal deficit present. Mental Status: She is alert and oriented to person, place, and time. Psychiatric:         Mood and Affect: Mood normal.         Behavior: Behavior normal.         Patient's questions answered and concerns addressed.  Patient agrees to plan of care.        Electronically signed by ALKA Krishnamurthy CNP on 9/23/2022 at 7:42 AM

## 2022-09-23 NOTE — PROGRESS NOTES
Blood drawn per order. Needle size: 23 g butterfly   Site: right top of hand   First attempt successful Yes    Second attempt na    Pressure applied until bleeding stopped. Cotton ball and band aid  applied. Patient informed to call office or return if bleeding reoccurs and unable to stop.     Tubes drawn: 1 purple     1 red

## 2022-09-24 LAB
BASOPHILS ABSOLUTE: 0.1 K/UL (ref 0–0.2)
BASOPHILS RELATIVE PERCENT: 1.3 %
EOSINOPHILS ABSOLUTE: 0.1 K/UL (ref 0–0.6)
EOSINOPHILS RELATIVE PERCENT: 2.2 %
ESTIMATED AVERAGE GLUCOSE: 102.5 MG/DL
HBA1C MFR BLD: 5.2 %
HCT VFR BLD CALC: 44.2 % (ref 36–48)
HEMOGLOBIN: 14.5 G/DL (ref 12–16)
LYMPHOCYTES ABSOLUTE: 2.1 K/UL (ref 1–5.1)
LYMPHOCYTES RELATIVE PERCENT: 42 %
MCH RBC QN AUTO: 31.2 PG (ref 26–34)
MCHC RBC AUTO-ENTMCNC: 32.9 G/DL (ref 31–36)
MCV RBC AUTO: 94.9 FL (ref 80–100)
MONOCYTES ABSOLUTE: 0.3 K/UL (ref 0–1.3)
MONOCYTES RELATIVE PERCENT: 6.5 %
NEUTROPHILS ABSOLUTE: 2.4 K/UL (ref 1.7–7.7)
NEUTROPHILS RELATIVE PERCENT: 48 %
PDW BLD-RTO: 14.6 % (ref 12.4–15.4)
PLATELET # BLD: 228 K/UL (ref 135–450)
PMV BLD AUTO: 8.6 FL (ref 5–10.5)
RBC # BLD: 4.66 M/UL (ref 4–5.2)
WBC # BLD: 5.1 K/UL (ref 4–11)

## 2022-09-26 RX ORDER — EZETIMIBE 10 MG/1
10 TABLET ORAL DAILY
Qty: 30 TABLET | Refills: 3 | Status: SHIPPED | OUTPATIENT
Start: 2022-09-26

## 2022-09-26 NOTE — RESULT ENCOUNTER NOTE
No anemia/ infection. Normal kidney/ liver function. No diabetes. Cholesterol much worse from prior. Recommend medication- I know we had issues with the Atorvastatin in the past but we could try Zetia which is not a statin if you are willing?

## 2022-10-20 ENCOUNTER — TELEPHONE (OUTPATIENT)
Dept: FAMILY MEDICINE CLINIC | Age: 62
End: 2022-10-20

## 2022-10-20 NOTE — TELEPHONE ENCOUNTER
When called to confirm patients appointment for tomorrow patient states she does not want to pay for a phone visit and requested to cancel. States to just let Varinder Cristy know that she is doing fine with the amitriptyline.

## 2023-01-08 ENCOUNTER — HOSPITAL ENCOUNTER (EMERGENCY)
Age: 63
Discharge: HOME | End: 2023-01-08
Payer: COMMERCIAL

## 2023-01-08 VITALS
HEART RATE: 93 BPM | SYSTOLIC BLOOD PRESSURE: 133 MMHG | OXYGEN SATURATION: 100 % | BODY MASS INDEX: 39.27 KG/M2 | HEIGHT: 64 IN | TEMPERATURE: 98.1 F | WEIGHT: 230 LBS | DIASTOLIC BLOOD PRESSURE: 80 MMHG | RESPIRATION RATE: 16 BRPM

## 2023-01-08 DIAGNOSIS — S61.209A AVULSION OF FINGER TIP, INITIAL ENCOUNTER: Primary | ICD-10-CM

## 2023-01-08 PROCEDURE — 99282 EMERGENCY DEPT VISIT SF MDM: CPT

## 2023-01-08 ASSESSMENT — ENCOUNTER SYMPTOMS
ABDOMINAL PAIN: 0
FACIAL SWELLING: 0
SHORTNESS OF BREATH: 0
SORE THROAT: 0
RHINORRHEA: 0
COLOR CHANGE: 0

## 2023-01-08 ASSESSMENT — PAIN SCALES - GENERAL: PAINLEVEL_OUTOF10: 7

## 2023-01-08 ASSESSMENT — PAIN - FUNCTIONAL ASSESSMENT: PAIN_FUNCTIONAL_ASSESSMENT: 0-10

## 2023-01-08 ASSESSMENT — LIFESTYLE VARIABLES
HOW MANY STANDARD DRINKS CONTAINING ALCOHOL DO YOU HAVE ON A TYPICAL DAY: PATIENT DOES NOT DRINK
HOW OFTEN DO YOU HAVE A DRINK CONTAINING ALCOHOL: NEVER

## 2023-01-08 ASSESSMENT — PAIN DESCRIPTION - LOCATION: LOCATION: FINGER (COMMENT WHICH ONE)

## 2023-01-08 ASSESSMENT — PAIN DESCRIPTION - PAIN TYPE: TYPE: ACUTE PAIN

## 2023-01-08 NOTE — ED NOTES
Wound care completed. Discharge instructions given. Pt verbalizes understanding. ,Milton Brantley, RN       Milton Brantley RN  01/08/23 [id number]

## 2023-01-08 NOTE — ED PROVIDER NOTES
[address] ED  EMERGENCY DEPARTMENT ENCOUNTER      I am the Primary Clinician of Record    Note started: 5:45 PM EST 1/8/23    MORELIA. I have evaluated this patient. My supervising physician was available for consultation. Pt Name: Georgiana Queen  MRN: [id number]  [place] 1960  Dateof evaluation: 1/8/2023  Provider: ALKA Zapata CNP  PCP: ALKA Restrepo CNP  ED Attending: No att. providers found      [address] [place] [address]       Chief Complaint   Patient presents with    Finger Laceration     Middle digit sliced with newly sharpened kitchen knife last night. HISTORY OF PRESENTILLNESS   (Location/Symptom, Timing/Onset, Context/Setting, Quality, Duration, Modifying Factors, Severity)  Note limiting factors. Georgiana Queen is a 60 y.o. female for finger tip laceration. Onset was last night. Context includes pt states she cut right middle finger last night. She is left handed. Pt states her tetanus is up to date. She reports she used \"cement\" last night to the laceration to stop the bleeding. She reports she removed the cement today and the bleeding returned. Pt states she reapplied the cement. Bleeding is controlled. Alleviating factors include nothing. Aggravating factors include nothing. Pain is 7/10. nothing has been used for pain today. Nursing Notes were all reviewed and agreed with or any disagreements were addressed  in the HPI. REVIEW OF SYSTEMS       Review of Systems   Constitutional:  Negative for activity change, appetite change and fever. HENT:  Negative for congestion, facial swelling, rhinorrhea and sore throat. Eyes:  Negative for visual disturbance. Respiratory:  Negative for shortness of breath. Cardiovascular:  Negative for chest pain. Gastrointestinal:  Negative for abdominal pain. Genitourinary:  Negative for difficulty urinating. Musculoskeletal:  Negative for arthralgias and myalgias.    Skin:  Positive for wound. Negative for color change and rash. Neurological:  Negative for dizziness and light-headedness. Psychiatric/Behavioral:  Negative for agitation. All other systems reviewed and are negative. Positives and Pertinent negatives as per HPI. Except as noted above in the ROS, all other systems were reviewed and negative. PAST MEDICAL HISTORY     Past Medical History:   Diagnosis Date    COVID-19     Hypertension     Thoracogenic scoliosis of thoracolumbar region     Unspecified sleep apnea          SURGICAL HISTORY       Past Surgical History:   Procedure Laterality Date    BREAST SURGERY       SECTION      COLONOSCOPY      POLYP    COLONOSCOPY  2017    polyp    CYST REMOVAL      HIP SURGERY Right     hip replacement     KNEE SURGERY Left 2019    knee replacement, [place]    TONSILLECTOMY AND ADENOIDECTOMY      TUBAL LIGATION           CURRENT MEDICATIONS       Previous Medications    AMITRIPTYLINE (ELAVIL) 25 MG TABLET    Take 1 tablet by mouth nightly Back pain    CELECOXIB (CELEBREX) 200 MG CAPSULE    TAKE ONE CAPSULE BY MOUTH DAILY pain    CETIRIZINE (ZYRTEC) 10 MG TABLET    Take 10 mg by mouth daily    CHOLECALCIFEROL (VITAMIN D3) 1.25 MG (72919 UT) CAPS    Take 3 capsules by mouth daily    EZETIMIBE (ZETIA) 10 MG TABLET    Take 1 tablet by mouth daily    FUROSEMIDE (LASIX) 20 MG TABLET    TAKE ONE TABLET BY MOUTH DAILY    GABAPENTIN (NEURONTIN) 600 MG TABLET    Take 1 tablet by mouth 3 times daily for 90 days. HYDROCODONE-ACETAMINOPHEN (NORCO) 5-325 MG PER TABLET    Take 1 tablet by mouth every 6 hours as needed for Pain for up to 180 days.     LISINOPRIL (PRINIVIL;ZESTRIL) 5 MG TABLET    TAKE ONE TABLET BY MOUTH DAILY blood pressure    MAGNESIUM 200 MG TABS TABLET    Take 200 mg by mouth daily    OMEGA-3 FATTY ACIDS (FISH OIL) 1000 MG CPDR    Take 2,000 mg by mouth 2 times daily    SAW PALMETTO 1000 MG CAPS    Take 1 capsule by mouth daily    ZINC GLUCONATE 50 MG TABLET Take 25 mg by mouth in the morning and at bedtime         ALLERGIES     Other, Atorvastatin, Levofloxacin in d5w, Bactrim, Lamisil [terbinafine], Penicillins, and Terbinafine hcl      FAMILY HISTORY     History reviewed. No pertinent family history. SOCIAL HISTORY       Social History     Socioeconomic History    Marital status:      Spouse name: None    Number of children: None    Years of education: None    Highest education level: None   Tobacco Use    Smoking status: Former     Packs/day: 1.00     Years: 25.00     Pack years: 25.00     Types: Cigarettes     Quit date: 2003     Years since quittin.5    Smokeless tobacco: Never   Substance and Sexual Activity    Alcohol use: Yes     Alcohol/week: 0.0 standard drinks     Comment: occasionally    Drug use: No    Sexual activity: Not Currently         SCREENINGS    Fletcher Coma Scale  Eye Opening: Spontaneous  Best Verbal Response: Oriented  Best Motor Response: Obeys commands  Fletcher Coma Scale Score: 15      CIWA Assessment  BP: 133/80  Heart Rate: 93          PHYSICAL EXAM     ED Triage Vitals [23 1713]   BP Temp Temp Source Heart Rate Resp SpO2 Height Weight   133/80 98.1 °F (36.7 °C) Oral 93 16 100 % 5' 4\" (1.626 m) 230 lb (104.3 kg)       Physical Exam  Constitutional:       Appearance: Normal appearance. She is well-developed. HENT:      Head: Normocephalic and atraumatic. Cardiovascular:      Rate and Rhythm: Normal rate. Pulmonary:      Effort: Pulmonary effort is normal. No respiratory distress. Abdominal:      General: There is no distension. Palpations: Abdomen is soft. Musculoskeletal:         General: Signs of injury present. No swelling, tenderness or deformity. Normal range of motion. Cervical back: Normal range of motion. Comments: Small avulsion type laceration to the lateral side of the ulnar aspect of the right middle finger. Bleeding controlled. Sensation and pulse intact to right hand. Skin:     General: Skin is warm and dry. Neurological:      Mental Status: She is alert and oriented to person, place, and time. DIAGNOSTIC RESULTS   LABS:    Labs Reviewed - No data to display      All other labs were within normal range or not returned as of this dictation. EKG: All EKG's are interpreted by the Emergency Department Physician who either signs or Co-signs this chart in the absence of a cardiologist.  Please see their note for interpretation of EKG. RADIOLOGY:   Non plain film images ans such as CT, ultrasound, and MRI are read by the radiologist. Plain radiographic images are visualized and preliminarily interpreted by the ED Provider with the below findings:            Interpretation per the Radiologist below, if available at the time of this note:    No orders to display     No results found. No results found. No results found. PROCEDURES   Unless otherwise noted below, none     Procedures     CRITICAL CARE TIME   Unless otherwise noted below, none        EMERGENCYDEPARTMENT COURSE/MDM:     Vitals:    Vitals:    01/08/23 1713   BP: 133/80   Pulse: 93   Resp: 16   Temp: 98.1 °F (36.7 °C)   TempSrc: Oral   SpO2: 100%   Weight: 230 lb (104.3 kg)   Height: 5' 4\" (1.626 m)         Patient was seen and evaluated by myself. Pt states she cut her right middle finger last night. She reports she used \"cement\" to stop the bleeding last night. She removed the cement last this am and it started bleeding again. Pt states she reapplied the cement again to stop the bleeding. On exam she is awake and alert hemodynamically stable and non toxic. Bleeding is controlled currently. Patient does have an avulsion laceration noted to the right middle finger. The bleeding is controlled and at this time I do not feel that sutures will be helpful at this time. Pt was encouraged to follow with her primary care doctor in 2 days for a reevaluation and to return to the er for any worsening symptoms.  Pt was discharged with all questions answered. Patient was given the following medications:  Medications - No data to display         CONSULTS:  None      Discussion with other professionals - none    Social determinants - none    Records Reviewed - none    History from - patient    Limitations to history- none    Chronic conditions: has a past medical history of COVID-19, Hypertension, Thoracogenic scoliosis of thoracolumbar region, and Unspecified sleep apnea. Is this patient to be included in the SEP-1 Core Measure due to severe sepsis or septic shock? No   Exclusion criteria - the patient is NOT to be included for SEP-1 Core Measure due to: Infection is not suspected         The patient tolerated their visit well. I have evaluated this patient. My supervising physician was available for consultation. The patient and / or the family were informed of the results of any tests, a time was given to answer questions, a plan was proposed and they agreed with plan. FINAL IMPRESSION      1. Avulsion of finger tip, initial encounter          DISPOSITION/PLAN   DISPOSITION Decision To Discharge 01/08/2023 05:43:54 PM      PATIENT REFERRED TO:  ALKA Gallo CNP  [place] [address]  [phone]    Schedule an appointment as soon as possible for a visit in 2 days  for re-evaluation    [facility]  [address]  [phone]    If symptoms worsen      DISCHARGE MEDICATIONS:  New Prescriptions    No medications on file       DISCONTINUED MEDICATIONS:  Discontinued Medications    No medications on file              (Please note that portions of this note were completed with a voice recognition program.  Efforts were made to edit the dictations but occasionally words are mis-transcribed.)    Patient was seen during the time of the [place] pandemic. N95 and appropriate PPE was worn during the visit.       ALKA Fay CNP (electronically signed)         ALKA Mar - MARCY  01/08/23 2721

## 2023-03-22 ENCOUNTER — TELEPHONE (OUTPATIENT)
Dept: FAMILY MEDICINE CLINIC | Age: 63
End: 2023-03-22

## 2023-03-22 NOTE — TELEPHONE ENCOUNTER
It likely will affect both blood pressure and blood sugar. I usually note that but if should would like to reschedule its fine.

## 2023-03-22 NOTE — TELEPHONE ENCOUNTER
Patient has an appointment scheduled to see Randa Smith DNP tomorrow. She is asking if she should reschedule because she is currently taking steroid for cough and sore throat, blood when she blows her nose.  Asking if it will affect her  blood pressure and blood work

## 2023-03-31 ENCOUNTER — OFFICE VISIT (OUTPATIENT)
Dept: FAMILY MEDICINE CLINIC | Age: 63
End: 2023-03-31

## 2023-03-31 VITALS
DIASTOLIC BLOOD PRESSURE: 76 MMHG | BODY MASS INDEX: 40.34 KG/M2 | TEMPERATURE: 97.3 F | WEIGHT: 235 LBS | HEART RATE: 68 BPM | OXYGEN SATURATION: 98 % | SYSTOLIC BLOOD PRESSURE: 124 MMHG

## 2023-03-31 DIAGNOSIS — M16.0 PRIMARY OSTEOARTHRITIS OF BOTH HIPS: ICD-10-CM

## 2023-03-31 DIAGNOSIS — M54.41 CHRONIC BILATERAL LOW BACK PAIN WITH RIGHT-SIDED SCIATICA: ICD-10-CM

## 2023-03-31 DIAGNOSIS — I10 ESSENTIAL HYPERTENSION: ICD-10-CM

## 2023-03-31 DIAGNOSIS — G89.29 CHRONIC BILATERAL LOW BACK PAIN WITH RIGHT-SIDED SCIATICA: ICD-10-CM

## 2023-03-31 DIAGNOSIS — E78.5 HYPERLIPIDEMIA, UNSPECIFIED HYPERLIPIDEMIA TYPE: ICD-10-CM

## 2023-03-31 DIAGNOSIS — Z12.31 SCREENING MAMMOGRAM, ENCOUNTER FOR: Primary | ICD-10-CM

## 2023-03-31 LAB
ALBUMIN SERPL-MCNC: 4.6 G/DL (ref 3.4–5)
ALBUMIN/GLOB SERPL: 1.4 {RATIO} (ref 1.1–2.2)
ALP SERPL-CCNC: 76 U/L (ref 40–129)
ALT SERPL-CCNC: 32 U/L (ref 10–40)
ANION GAP SERPL CALCULATED.3IONS-SCNC: 15 MMOL/L (ref 3–16)
AST SERPL-CCNC: 26 U/L (ref 15–37)
BASOPHILS # BLD: 0 K/UL (ref 0–0.2)
BASOPHILS NFR BLD: 0.8 %
BILIRUB SERPL-MCNC: 0.3 MG/DL (ref 0–1)
BUN SERPL-MCNC: 21 MG/DL (ref 7–20)
CALCIUM SERPL-MCNC: 10.4 MG/DL (ref 8.3–10.6)
CHLORIDE SERPL-SCNC: 104 MMOL/L (ref 99–110)
CHOLEST SERPL-MCNC: 241 MG/DL (ref 0–199)
CO2 SERPL-SCNC: 23 MMOL/L (ref 21–32)
CREAT SERPL-MCNC: 0.8 MG/DL (ref 0.6–1.2)
CREAT UR-MCNC: 35.8 MG/DL (ref 28–259)
DEPRECATED RDW RBC AUTO: 14.2 % (ref 12.4–15.4)
EOSINOPHIL # BLD: 0.1 K/UL (ref 0–0.6)
EOSINOPHIL NFR BLD: 2 %
GFR SERPLBLD CREATININE-BSD FMLA CKD-EPI: >60 ML/MIN/{1.73_M2}
GLUCOSE SERPL-MCNC: 96 MG/DL (ref 70–99)
HCT VFR BLD AUTO: 46.7 % (ref 36–48)
HDLC SERPL-MCNC: 58 MG/DL (ref 40–60)
HGB BLD-MCNC: 15.2 G/DL (ref 12–16)
LDLC SERPL CALC-MCNC: 157 MG/DL
LYMPHOCYTES # BLD: 2.4 K/UL (ref 1–5.1)
LYMPHOCYTES NFR BLD: 39.8 %
MCH RBC QN AUTO: 31.1 PG (ref 26–34)
MCHC RBC AUTO-ENTMCNC: 32.4 G/DL (ref 31–36)
MCV RBC AUTO: 95.8 FL (ref 80–100)
MONOCYTES # BLD: 0.5 K/UL (ref 0–1.3)
MONOCYTES NFR BLD: 7.5 %
NEUTROPHILS # BLD: 3 K/UL (ref 1.7–7.7)
NEUTROPHILS NFR BLD: 49.9 %
PLATELET # BLD AUTO: 205 K/UL (ref 135–450)
PMV BLD AUTO: 8.6 FL (ref 5–10.5)
POTASSIUM SERPL-SCNC: 4.7 MMOL/L (ref 3.5–5.1)
PROT SERPL-MCNC: 7.8 G/DL (ref 6.4–8.2)
PROT UR-MCNC: <4 MG/DL
PROT/CREAT UR-RTO: NORMAL MG/DL
RBC # BLD AUTO: 4.88 M/UL (ref 4–5.2)
SODIUM SERPL-SCNC: 142 MMOL/L (ref 136–145)
TRIGL SERPL-MCNC: 129 MG/DL (ref 0–150)
VLDLC SERPL CALC-MCNC: 26 MG/DL
WBC # BLD AUTO: 6.1 K/UL (ref 4–11)

## 2023-03-31 RX ORDER — AMITRIPTYLINE HYDROCHLORIDE 25 MG/1
25 TABLET, FILM COATED ORAL NIGHTLY
Qty: 90 TABLET | Refills: 1 | Status: SHIPPED | OUTPATIENT
Start: 2023-03-31 | End: 2023-06-29

## 2023-03-31 RX ORDER — FUROSEMIDE 20 MG/1
TABLET ORAL
Qty: 90 TABLET | Refills: 3 | Status: SHIPPED | OUTPATIENT
Start: 2023-03-31

## 2023-03-31 RX ORDER — VITAMIN B COMPLEX
1 CAPSULE ORAL DAILY
COMMUNITY

## 2023-03-31 RX ORDER — CELECOXIB 200 MG/1
CAPSULE ORAL
Qty: 90 CAPSULE | Refills: 3 | Status: SHIPPED | OUTPATIENT
Start: 2023-03-31

## 2023-03-31 RX ORDER — LISINOPRIL 5 MG/1
TABLET ORAL
Qty: 90 TABLET | Refills: 3 | Status: SHIPPED | OUTPATIENT
Start: 2023-03-31

## 2023-03-31 SDOH — ECONOMIC STABILITY: HOUSING INSECURITY
IN THE LAST 12 MONTHS, WAS THERE A TIME WHEN YOU DID NOT HAVE A STEADY PLACE TO SLEEP OR SLEPT IN A SHELTER (INCLUDING NOW)?: NO

## 2023-03-31 SDOH — ECONOMIC STABILITY: FOOD INSECURITY: WITHIN THE PAST 12 MONTHS, THE FOOD YOU BOUGHT JUST DIDN'T LAST AND YOU DIDN'T HAVE MONEY TO GET MORE.: NEVER TRUE

## 2023-03-31 SDOH — ECONOMIC STABILITY: INCOME INSECURITY: HOW HARD IS IT FOR YOU TO PAY FOR THE VERY BASICS LIKE FOOD, HOUSING, MEDICAL CARE, AND HEATING?: NOT HARD AT ALL

## 2023-03-31 SDOH — ECONOMIC STABILITY: FOOD INSECURITY: WITHIN THE PAST 12 MONTHS, YOU WORRIED THAT YOUR FOOD WOULD RUN OUT BEFORE YOU GOT MONEY TO BUY MORE.: NEVER TRUE

## 2023-03-31 ASSESSMENT — ENCOUNTER SYMPTOMS
CHEST TIGHTNESS: 0
COUGH: 0
DIARRHEA: 0
BLOOD IN STOOL: 0
BACK PAIN: 1
SHORTNESS OF BREATH: 0
CONSTIPATION: 0

## 2023-03-31 ASSESSMENT — PATIENT HEALTH QUESTIONNAIRE - PHQ9
SUM OF ALL RESPONSES TO PHQ QUESTIONS 1-9: 0
1. LITTLE INTEREST OR PLEASURE IN DOING THINGS: 0
2. FEELING DOWN, DEPRESSED OR HOPELESS: 0
SUM OF ALL RESPONSES TO PHQ9 QUESTIONS 1 & 2: 0
SUM OF ALL RESPONSES TO PHQ QUESTIONS 1-9: 0

## 2023-03-31 NOTE — PATIENT INSTRUCTIONS
[email or web address]   <-----OB/ GYN, has Mammogram in office.

## 2023-03-31 NOTE — PROGRESS NOTES
nervous/anxious. Current Outpatient Medications on File Prior to Visit   Medication Sig Dispense Refill    b complex vitamins capsule Take 1 capsule by mouth daily      Potassium 99 MG TABS Take 1 tablet by mouth daily      cetirizine (ZYRTEC) 10 MG tablet Take 10 mg by mouth daily      Omega-3 Fatty Acids (FISH OIL) 1000 MG CPDR Take 1,000 mg by mouth 2 times daily      Cholecalciferol (VITAMIN D3) 1.25 MG (59770 UT) CAPS Take 1 capsule by mouth 2 times daily      magnesium 200 MG TABS tablet Take 200 mg by mouth daily      zinc gluconate 50 MG tablet Take 50 mg by mouth daily       No current facility-administered medications on file prior to visit. Allergies   Allergen Reactions    Other      Bandage that covered hip caused severeblisters and nerve damage after hip replacement  Bandage that covered hip caused severeblisters and nerve damage after hip replacement      Atorvastatin Other (See Comments)     Alopecia 21  Uterine spasms  Alopecia 21  Uterine spasms      Levofloxacin In D5w Other (See Comments)     Bladder spasm  Other reaction(s): Other (See Comments)  Bladder spasm  Bladder spasm  Other reaction(s):  Other (See Comments)  Bladder spasm      Bactrim Rash    Lamisil [Terbinafine] Rash    Penicillins Other (See Comments) and Rash     Had reaction as a child      Terbinafine Hcl Rash     Past Medical History:   Diagnosis Date    COVID-19     Hypertension     Thoracogenic scoliosis of thoracolumbar region     Unspecified sleep apnea      Past Surgical History:   Procedure Laterality Date    BREAST SURGERY       SECTION      COLONOSCOPY      POLYP    COLONOSCOPY  2017    polyp    CYST REMOVAL      HIP SURGERY Right     hip replacement     KNEE SURGERY Left 2019    knee replacement, [place]    TONSILLECTOMY AND ADENOIDECTOMY      TUBAL LIGATION       Social History     Tobacco Use    Smoking status: Former     Packs/day: 1.00     Years: 25.00     Pack years: 25.00

## 2023-04-01 DIAGNOSIS — G89.29 CHRONIC BILATERAL LOW BACK PAIN WITH RIGHT-SIDED SCIATICA: ICD-10-CM

## 2023-04-01 DIAGNOSIS — M54.41 CHRONIC BILATERAL LOW BACK PAIN WITH RIGHT-SIDED SCIATICA: ICD-10-CM

## 2023-04-03 RX ORDER — AMITRIPTYLINE HYDROCHLORIDE 25 MG/1
TABLET, FILM COATED ORAL
Qty: 90 TABLET | Refills: 1 | OUTPATIENT
Start: 2023-04-03

## 2023-10-05 ENCOUNTER — OFFICE VISIT (OUTPATIENT)
Dept: FAMILY MEDICINE CLINIC | Age: 63
End: 2023-10-05
Payer: COMMERCIAL

## 2023-10-05 VITALS
WEIGHT: 240 LBS | HEART RATE: 73 BPM | OXYGEN SATURATION: 96 % | BODY MASS INDEX: 41.2 KG/M2 | TEMPERATURE: 97.3 F | SYSTOLIC BLOOD PRESSURE: 120 MMHG | DIASTOLIC BLOOD PRESSURE: 82 MMHG

## 2023-10-05 DIAGNOSIS — M16.0 PRIMARY OSTEOARTHRITIS OF BOTH HIPS: ICD-10-CM

## 2023-10-05 DIAGNOSIS — E78.5 HYPERLIPIDEMIA, UNSPECIFIED HYPERLIPIDEMIA TYPE: ICD-10-CM

## 2023-10-05 DIAGNOSIS — I10 PRIMARY HYPERTENSION: Primary | ICD-10-CM

## 2023-10-05 DIAGNOSIS — E66.01 CLASS 3 SEVERE OBESITY DUE TO EXCESS CALORIES WITH SERIOUS COMORBIDITY AND BODY MASS INDEX (BMI) OF 40.0 TO 44.9 IN ADULT (HCC): ICD-10-CM

## 2023-10-05 PROCEDURE — 3078F DIAST BP <80 MM HG: CPT | Performed by: NURSE PRACTITIONER

## 2023-10-05 PROCEDURE — 3074F SYST BP LT 130 MM HG: CPT | Performed by: NURSE PRACTITIONER

## 2023-10-05 PROCEDURE — 99214 OFFICE O/P EST MOD 30 MIN: CPT | Performed by: NURSE PRACTITIONER

## 2023-10-05 ASSESSMENT — ENCOUNTER SYMPTOMS
SHORTNESS OF BREATH: 0
DIARRHEA: 0
CHEST TIGHTNESS: 0
BLOOD IN STOOL: 0
CONSTIPATION: 0
BACK PAIN: 1
COUGH: 0

## 2023-10-05 ASSESSMENT — PATIENT HEALTH QUESTIONNAIRE - PHQ9
3. TROUBLE FALLING OR STAYING ASLEEP: 0
7. TROUBLE CONCENTRATING ON THINGS, SUCH AS READING THE NEWSPAPER OR WATCHING TELEVISION: 0
10. IF YOU CHECKED OFF ANY PROBLEMS, HOW DIFFICULT HAVE THESE PROBLEMS MADE IT FOR YOU TO DO YOUR WORK, TAKE CARE OF THINGS AT HOME, OR GET ALONG WITH OTHER PEOPLE: 0
SUM OF ALL RESPONSES TO PHQ QUESTIONS 1-9: 1
5. POOR APPETITE OR OVEREATING: 0
8. MOVING OR SPEAKING SO SLOWLY THAT OTHER PEOPLE COULD HAVE NOTICED. OR THE OPPOSITE, BEING SO FIGETY OR RESTLESS THAT YOU HAVE BEEN MOVING AROUND A LOT MORE THAN USUAL: 0
SUM OF ALL RESPONSES TO PHQ QUESTIONS 1-9: 1
1. LITTLE INTEREST OR PLEASURE IN DOING THINGS: 1
2. FEELING DOWN, DEPRESSED OR HOPELESS: 0
SUM OF ALL RESPONSES TO PHQ QUESTIONS 1-9: 1
9. THOUGHTS THAT YOU WOULD BE BETTER OFF DEAD, OR OF HURTING YOURSELF: 0
SUM OF ALL RESPONSES TO PHQ QUESTIONS 1-9: 1
SUM OF ALL RESPONSES TO PHQ9 QUESTIONS 1 & 2: 1
6. FEELING BAD ABOUT YOURSELF - OR THAT YOU ARE A FAILURE OR HAVE LET YOURSELF OR YOUR FAMILY DOWN: 0
4. FEELING TIRED OR HAVING LITTLE ENERGY: 0

## 2023-10-05 NOTE — PROGRESS NOTES
headaches. Psychiatric/Behavioral:  Negative for decreased concentration, dysphoric mood, self-injury, sleep disturbance and suicidal ideas. The patient is not nervous/anxious. Current Outpatient Medications on File Prior to Visit   Medication Sig Dispense Refill    UNABLE TO FIND Take 1 capsule by mouth Daily Bentofinomine      b complex vitamins capsule Take 1 capsule by mouth daily      Potassium 99 MG TABS Take 1 tablet by mouth daily      furosemide (LASIX) 20 MG tablet TAKE ONE TABLET BY MOUTH DAILY 90 tablet 3    celecoxib (CELEBREX) 200 MG capsule TAKE ONE CAPSULE BY MOUTH DAILY pain 90 capsule 3    cetirizine (ZYRTEC) 10 MG tablet Take 1 tablet by mouth daily      Omega-3 Fatty Acids (FISH OIL) 1000 MG CPDR Take 1 capsule by mouth 2 times daily      Cholecalciferol (VITAMIN D3) 1.25 MG (19620 UT) CAPS Take 1 capsule by mouth 2 times daily      zinc gluconate 50 MG tablet Take 1 tablet by mouth daily      magnesium 200 MG TABS tablet Take 1 tablet by mouth daily       No current facility-administered medications on file prior to visit. Allergies   Allergen Reactions    Other      Bandage that covered hip caused severeblisters and nerve damage after hip replacement  Bandage that covered hip caused severeblisters and nerve damage after hip replacement      Atorvastatin Other (See Comments)     Alopecia 4/7/21  Uterine spasms  Alopecia 4/7/21  Uterine spasms      Levofloxacin In D5w Other (See Comments)     Bladder spasm  Other reaction(s): Other (See Comments)  Bladder spasm  Bladder spasm  Other reaction(s):  Other (See Comments)  Bladder spasm      Bactrim Rash    Lamisil [Terbinafine] Rash    Penicillins Other (See Comments) and Rash     Had reaction as a child      Rosuvastatin Other (See Comments)     Allopecia      Terbinafine Hcl Rash     Past Medical History:   Diagnosis Date    COVID-19     Hypertension     Thoracogenic scoliosis of thoracolumbar region     Unspecified sleep apnea

## 2023-10-18 ENCOUNTER — TELEPHONE (OUTPATIENT)
Dept: FAMILY MEDICINE CLINIC | Age: 63
End: 2023-10-18

## 2023-10-18 DIAGNOSIS — I10 ESSENTIAL HYPERTENSION: ICD-10-CM

## 2023-10-18 DIAGNOSIS — M16.0 PRIMARY OSTEOARTHRITIS OF BOTH HIPS: ICD-10-CM

## 2023-10-18 RX ORDER — LISINOPRIL 5 MG/1
TABLET ORAL
Qty: 90 TABLET | Refills: 3 | Status: SHIPPED | OUTPATIENT
Start: 2023-10-18

## 2023-10-18 RX ORDER — FUROSEMIDE 20 MG/1
TABLET ORAL
Qty: 90 TABLET | Refills: 3 | Status: SHIPPED | OUTPATIENT
Start: 2023-10-18

## 2023-10-18 NOTE — TELEPHONE ENCOUNTER
Patient states she thinks JOSEFA was waiting on her to [facility] sending in a cholesterol medication for her triglycerides, states it is ok to send to [facility]. Please advise.

## 2023-10-19 NOTE — TELEPHONE ENCOUNTER
Pt called back and stated that yes she looked it up and is willing to try that one and said to go ahead and send in.  Pt states ok to leave detailed message if needs to call back,

## 2023-10-23 ENCOUNTER — COMMUNITY OUTREACH (OUTPATIENT)
Dept: FAMILY MEDICINE CLINIC | Age: 63
End: 2023-10-23

## 2023-11-21 ENCOUNTER — OFFICE VISIT (OUTPATIENT)
Dept: FAMILY MEDICINE CLINIC | Age: 63
End: 2023-11-21
Payer: COMMERCIAL

## 2023-11-21 VITALS — BODY MASS INDEX: 40.85 KG/M2 | SYSTOLIC BLOOD PRESSURE: 128 MMHG | DIASTOLIC BLOOD PRESSURE: 64 MMHG | WEIGHT: 238 LBS

## 2023-11-21 DIAGNOSIS — R59.0 ENLARGED LYMPH NODE IN NECK: Primary | ICD-10-CM

## 2023-11-21 PROCEDURE — 3078F DIAST BP <80 MM HG: CPT | Performed by: NURSE PRACTITIONER

## 2023-11-21 PROCEDURE — 99213 OFFICE O/P EST LOW 20 MIN: CPT | Performed by: NURSE PRACTITIONER

## 2023-11-21 PROCEDURE — 3074F SYST BP LT 130 MM HG: CPT | Performed by: NURSE PRACTITIONER

## 2023-11-21 RX ORDER — CLINDAMYCIN HYDROCHLORIDE 300 MG/1
300 CAPSULE ORAL 4 TIMES DAILY
Qty: 28 CAPSULE | Refills: 0 | Status: SHIPPED | OUTPATIENT
Start: 2023-11-21 | End: 2023-11-28

## 2023-11-21 ASSESSMENT — ENCOUNTER SYMPTOMS
VOICE CHANGE: 0
SORE THROAT: 0
FACIAL SWELLING: 0
TROUBLE SWALLOWING: 0
SHORTNESS OF BREATH: 0
CONSTIPATION: 0
SINUS PRESSURE: 0
BLOOD IN STOOL: 0
CHEST TIGHTNESS: 0
DIARRHEA: 0
SINUS PAIN: 0
COUGH: 0
RHINORRHEA: 0

## 2023-11-21 NOTE — PROGRESS NOTES
11/21/2023    Chief Complaint   Patient presents with    Mass     On left side of face   Painful   Red around one     Other     Right wrist        Jonathan Wells is a 60 y.o. female, presents today for:      ASSESSMENT/PLAN:    1. Enlarged lymph node in neck  Unclear etiology. Discussed watchful waiting and see if edema/ lymph node improves due to lack of other symptoms. Due to holiday this week will go ahead and give Clindamycin incase her symptoms worsen and she is unable to see a provider or pick medication up from the pharmacy. RTC PRN and if symptoms do not worsen. - clindamycin (CLEOCIN) 300 MG capsule; Take 1 capsule by mouth 4 times daily for 7 days  Dispense: 28 capsule; Refill: 0      Return for keep April. .       Presenting today due to swelling on left chin and red lesion on side of face near eye. Lesions started yesterday and are very tender to touch. No other symptoms- ear pain, jaw pain, sore throat, fever, unintentional weight loss, diaphoresis, SOB, chest pain, cough. Also notes she had her wisdom tooth on her lower jaw removed in September but the pain resolved around 3 weeks ago. Was given Clindamycin in September due to PCN allergy. Also notes she had right posterior forearm bruise which happened a couple of weeks initially. Was very red and did not resolve for several weeks. A couple of days ago it turned into a light purple bruise. Concerned about possible leukemia.         Lab Results   Component Value Date     03/31/2023    K 4.7 03/31/2023     03/31/2023    CO2 23 03/31/2023    BUN 21 (H) 03/31/2023    CREATININE 0.8 03/31/2023    GLUCOSE 96 03/31/2023    CALCIUM 10.4 03/31/2023    PROT 7.8 03/31/2023    LABALBU 4.6 03/31/2023    BILITOT 0.3 03/31/2023    ALKPHOS 76 03/31/2023    AST 26 03/31/2023    ALT 32 03/31/2023    LABGLOM >60 03/31/2023    GFRAA >60 09/23/2022    AGRATIO 1.4 03/31/2023    GLOB 2.8 10/11/2021         Review of Systems

## 2023-11-23 ENCOUNTER — HOSPITAL ENCOUNTER (EMERGENCY)
Age: 63
Discharge: HOME | End: 2023-11-23
Payer: COMMERCIAL

## 2023-11-23 VITALS
HEART RATE: 78 BPM | TEMPERATURE: 98 F | OXYGEN SATURATION: 98 % | DIASTOLIC BLOOD PRESSURE: 84 MMHG | WEIGHT: 237 LBS | HEIGHT: 64 IN | BODY MASS INDEX: 40.46 KG/M2 | RESPIRATION RATE: 18 BRPM | SYSTOLIC BLOOD PRESSURE: 154 MMHG

## 2023-11-23 DIAGNOSIS — L03.211 FACIAL CELLULITIS: Primary | ICD-10-CM

## 2023-11-23 PROCEDURE — 99283 EMERGENCY DEPT VISIT LOW MDM: CPT

## 2023-11-23 PROCEDURE — 6370000000 HC RX 637 (ALT 250 FOR IP)

## 2023-11-23 PROCEDURE — 6370000000 HC RX 637 (ALT 250 FOR IP): Performed by: PHYSICIAN ASSISTANT

## 2023-11-23 RX ORDER — GENTAMICIN SULFATE 3 MG/ML
SOLUTION/ DROPS OPHTHALMIC
Status: DISCONTINUED
Start: 2023-11-23 | End: 2023-11-23 | Stop reason: HOSPADM

## 2023-11-23 RX ORDER — GENTAMICIN SULFATE 3 MG/ML
1 SOLUTION/ DROPS OPHTHALMIC
Status: DISCONTINUED | OUTPATIENT
Start: 2023-11-23 | End: 2023-11-23 | Stop reason: HOSPADM

## 2023-11-23 RX ORDER — GENTAMICIN SULFATE 1 MG/G
OINTMENT TOPICAL
Qty: 15 G | Refills: 1 | Status: SHIPPED | OUTPATIENT
Start: 2023-11-23 | End: 2023-11-30

## 2023-11-23 RX ORDER — GENTAMICIN SULFATE 3 MG/ML
SOLUTION/ DROPS OPHTHALMIC
Status: COMPLETED
Start: 2023-11-23 | End: 2023-11-23

## 2023-11-23 RX ORDER — DOXYCYCLINE HYCLATE 100 MG
100 TABLET ORAL ONCE
Status: COMPLETED | OUTPATIENT
Start: 2023-11-23 | End: 2023-11-23

## 2023-11-23 RX ORDER — DOXYCYCLINE 100 MG/1
100 CAPSULE ORAL 2 TIMES DAILY
Qty: 20 CAPSULE | Refills: 0 | Status: SHIPPED | OUTPATIENT
Start: 2023-11-23 | End: 2023-12-03

## 2023-11-23 RX ADMIN — GENTAMICIN SULFATE 1 DROP: 3 SOLUTION/ DROPS OPHTHALMIC at 17:00

## 2023-11-23 RX ADMIN — GENTAMICIN SULFATE 1 DROP: 3 SOLUTION OPHTHALMIC at 17:00

## 2023-11-23 RX ADMIN — DOXYCYCLINE HYCLATE 100 MG: 100 TABLET, COATED ORAL at 16:36

## 2023-11-23 ASSESSMENT — PAIN DESCRIPTION - ORIENTATION: ORIENTATION: LEFT

## 2023-11-23 ASSESSMENT — PAIN SCALES - GENERAL: PAINLEVEL_OUTOF10: 3

## 2023-11-23 ASSESSMENT — PAIN - FUNCTIONAL ASSESSMENT: PAIN_FUNCTIONAL_ASSESSMENT: 0-10

## 2023-11-23 ASSESSMENT — PAIN DESCRIPTION - LOCATION: LOCATION: EYE

## 2023-11-23 NOTE — DISCHARGE INSTRUCTIONS
Warm Compress    Warm Epsom Salt Water Compresses or Soaks Every Hour Today then 3-7 times daily for 5-7 days. Use 1/4 cup Epsom Salt to 16 Oz of hot water. Add a tablespoon of bleach or antibacterial soap to the water.

## 2023-11-23 NOTE — ED PROVIDER NOTES
**ADVANCED PRACTICE PROVIDER, I HAVE EVALUATED THIS [facility] ED  EMERGENCY DEPARTMENT ENCOUNTER      Pt Name: Lidia Macias  UV  [address] 1960  Date of evaluation: 2023  Provider: Felicia Clifford PA-C  Note Started: 5:23 PM EST 23        Chief Complaint:    Chief Complaint   Patient presents with    Eye Swelling     Put on abx for poss cellulitis on face on tues now having L eye swelling. Nursing Notes, Past Medical Hx, Past Surgical Hx, Social Hx, Allergies, and Family Hx were all reviewed and agreed with or any disagreements were addressed in the HPI.    HPI: (Location, Duration, Timing, Severity, Quality, Assoc Sx, Context, Modifying factors)    History From: The patient  Limitations to history : None    Social Determinants Significantly Affecting Health : None    Chief Complaint of eye swelling/facial swelling    This is a  60 y.o. female who presents to the emergency department, the patient states that 3 days ago she noticed a her ear, the next day she ended up missing a lesion on the left side, and some lateral upper and lower eyelid edema. Because the eyelid edema has gotten progressively worse despite her being on oral clindamycin for 36 hours she decided come to the emergency department. She denies any vision changes, she denies any pain with range of motion of her eye. She denies any fevers or chills.     PastMedical/Surgical History:      Diagnosis Date    COVID-19     Hypertension     Thoracogenic scoliosis of thoracolumbar region     Unspecified sleep apnea          Procedure Laterality Date    BREAST SURGERY       SECTION      COLONOSCOPY      POLYP    COLONOSCOPY  2017    polyp    CYST REMOVAL      HIP SURGERY Right     hip replacement     KNEE SURGERY Left 2019    knee replacement, [place]    TONSILLECTOMY AND ADENOIDECTOMY      TUBAL LIGATION         Medications:  Previous Medications    B COMPLEX VITAMINS

## 2023-11-27 ENCOUNTER — TELEPHONE (OUTPATIENT)
Dept: FAMILY MEDICINE CLINIC | Age: 63
End: 2023-11-27

## 2023-11-27 NOTE — TELEPHONE ENCOUNTER
Patient stated it just started looking better last night up till then it got worse, but now it is starting to go in the right direction but still has a ways to go

## 2023-11-27 NOTE — TELEPHONE ENCOUNTER
----- Message from ALKA Perez CNP sent at 11/27/2023  9:31 AM EST -----  How is she feeling post ER evaluation?     Thanks.     ----- Message -----  From: Discharge Provider, Automatic  Sent: 11/23/2023   5:30 PM EST  To: ALKA Perez CNP

## 2024-02-27 DIAGNOSIS — E78.5 HYPERLIPIDEMIA, UNSPECIFIED HYPERLIPIDEMIA TYPE: ICD-10-CM

## 2024-02-27 RX ORDER — BEMPEDOIC ACID 180 MG/1
1 TABLET, FILM COATED ORAL DAILY
Qty: 90 TABLET | Refills: 1 | Status: SHIPPED | OUTPATIENT
Start: 2024-02-27

## 2024-04-03 SDOH — ECONOMIC STABILITY: FOOD INSECURITY: WITHIN THE PAST 12 MONTHS, THE FOOD YOU BOUGHT JUST DIDN'T LAST AND YOU DIDN'T HAVE MONEY TO GET MORE.: NEVER TRUE

## 2024-04-03 SDOH — ECONOMIC STABILITY: FOOD INSECURITY: WITHIN THE PAST 12 MONTHS, YOU WORRIED THAT YOUR FOOD WOULD RUN OUT BEFORE YOU GOT MONEY TO BUY MORE.: NEVER TRUE

## 2024-04-03 SDOH — ECONOMIC STABILITY: INCOME INSECURITY: HOW HARD IS IT FOR YOU TO PAY FOR THE VERY BASICS LIKE FOOD, HOUSING, MEDICAL CARE, AND HEATING?: SOMEWHAT HARD

## 2024-04-03 SDOH — ECONOMIC STABILITY: TRANSPORTATION INSECURITY
IN THE PAST 12 MONTHS, HAS LACK OF TRANSPORTATION KEPT YOU FROM MEETINGS, WORK, OR FROM GETTING THINGS NEEDED FOR DAILY LIVING?: NO

## 2024-04-03 ASSESSMENT — PATIENT HEALTH QUESTIONNAIRE - PHQ9
SUM OF ALL RESPONSES TO PHQ QUESTIONS 1-9: 2
SUM OF ALL RESPONSES TO PHQ QUESTIONS 1-9: 2
SUM OF ALL RESPONSES TO PHQ9 QUESTIONS 1 & 2: 2
1. LITTLE INTEREST OR PLEASURE IN DOING THINGS: SEVERAL DAYS
1. LITTLE INTEREST OR PLEASURE IN DOING THINGS: SEVERAL DAYS
2. FEELING DOWN, DEPRESSED OR HOPELESS: SEVERAL DAYS
2. FEELING DOWN, DEPRESSED OR HOPELESS: SEVERAL DAYS
SUM OF ALL RESPONSES TO PHQ QUESTIONS 1-9: 2
SUM OF ALL RESPONSES TO PHQ QUESTIONS 1-9: 2
SUM OF ALL RESPONSES TO PHQ9 QUESTIONS 1 & 2: 2

## 2024-04-04 ENCOUNTER — OFFICE VISIT (OUTPATIENT)
Dept: FAMILY MEDICINE CLINIC | Age: 64
End: 2024-04-04
Payer: COMMERCIAL

## 2024-04-04 ENCOUNTER — TELEPHONE (OUTPATIENT)
Dept: FAMILY MEDICINE CLINIC | Age: 64
End: 2024-04-04

## 2024-04-04 VITALS
BODY MASS INDEX: 40.68 KG/M2 | DIASTOLIC BLOOD PRESSURE: 94 MMHG | SYSTOLIC BLOOD PRESSURE: 160 MMHG | OXYGEN SATURATION: 96 % | TEMPERATURE: 97.6 F | HEART RATE: 86 BPM | WEIGHT: 237 LBS

## 2024-04-04 DIAGNOSIS — M16.0 PRIMARY OSTEOARTHRITIS OF BOTH HIPS: ICD-10-CM

## 2024-04-04 DIAGNOSIS — I10 ESSENTIAL HYPERTENSION: Primary | ICD-10-CM

## 2024-04-04 DIAGNOSIS — G89.29 CHRONIC BILATERAL LOW BACK PAIN WITH RIGHT-SIDED SCIATICA: ICD-10-CM

## 2024-04-04 DIAGNOSIS — Z12.31 SCREENING MAMMOGRAM, ENCOUNTER FOR: ICD-10-CM

## 2024-04-04 DIAGNOSIS — M43.10 ACQUIRED SPONDYLOLISTHESIS: ICD-10-CM

## 2024-04-04 DIAGNOSIS — M54.41 CHRONIC BILATERAL LOW BACK PAIN WITH RIGHT-SIDED SCIATICA: ICD-10-CM

## 2024-04-04 DIAGNOSIS — E78.5 HYPERLIPIDEMIA, UNSPECIFIED HYPERLIPIDEMIA TYPE: ICD-10-CM

## 2024-04-04 DIAGNOSIS — M41.9 SCOLIOSIS OF THORACOLUMBAR SPINE, UNSPECIFIED SCOLIOSIS TYPE: ICD-10-CM

## 2024-04-04 LAB
ALBUMIN SERPL-MCNC: 4.8 G/DL (ref 3.4–5)
ALBUMIN/GLOB SERPL: 1.7 {RATIO} (ref 1.1–2.2)
ALP SERPL-CCNC: 61 U/L (ref 40–129)
ALT SERPL-CCNC: 33 U/L (ref 10–40)
ANION GAP SERPL CALCULATED.3IONS-SCNC: 13 MMOL/L (ref 3–16)
AST SERPL-CCNC: 30 U/L (ref 15–37)
BASOPHILS # BLD: 0.1 K/UL (ref 0–0.2)
BASOPHILS NFR BLD: 1.2 %
BILIRUB SERPL-MCNC: 0.3 MG/DL (ref 0–1)
BUN SERPL-MCNC: 20 MG/DL (ref 7–20)
CALCIUM SERPL-MCNC: 10.5 MG/DL (ref 8.3–10.6)
CHLORIDE SERPL-SCNC: 103 MMOL/L (ref 99–110)
CHOLEST SERPL-MCNC: 190 MG/DL (ref 0–199)
CO2 SERPL-SCNC: 24 MMOL/L (ref 21–32)
CREAT SERPL-MCNC: 0.9 MG/DL (ref 0.6–1.2)
CREAT UR-MCNC: 35.8 MG/DL (ref 28–259)
DEPRECATED RDW RBC AUTO: 13.8 % (ref 12.4–15.4)
EOSINOPHIL # BLD: 0.2 K/UL (ref 0–0.6)
EOSINOPHIL NFR BLD: 4.1 %
GFR SERPLBLD CREATININE-BSD FMLA CKD-EPI: 71 ML/MIN/{1.73_M2}
GLUCOSE SERPL-MCNC: 106 MG/DL (ref 70–99)
HCT VFR BLD AUTO: 44.2 % (ref 36–48)
HDLC SERPL-MCNC: 48 MG/DL (ref 40–60)
HGB BLD-MCNC: 14.9 G/DL (ref 12–16)
LDLC SERPL CALC-MCNC: 115 MG/DL
LYMPHOCYTES # BLD: 2.4 K/UL (ref 1–5.1)
LYMPHOCYTES NFR BLD: 46.6 %
MCH RBC QN AUTO: 31.9 PG (ref 26–34)
MCHC RBC AUTO-ENTMCNC: 33.8 G/DL (ref 31–36)
MCV RBC AUTO: 94.7 FL (ref 80–100)
MONOCYTES # BLD: 0.4 K/UL (ref 0–1.3)
MONOCYTES NFR BLD: 7.8 %
NEUTROPHILS # BLD: 2.1 K/UL (ref 1.7–7.7)
NEUTROPHILS NFR BLD: 40.3 %
PLATELET # BLD AUTO: 240 K/UL (ref 135–450)
PMV BLD AUTO: 9 FL (ref 5–10.5)
POTASSIUM SERPL-SCNC: 4.3 MMOL/L (ref 3.5–5.1)
PROT SERPL-MCNC: 7.6 G/DL (ref 6.4–8.2)
PROT UR-MCNC: <4 MG/DL
PROT/CREAT UR-RTO: NORMAL MG/DL
RBC # BLD AUTO: 4.67 M/UL (ref 4–5.2)
SODIUM SERPL-SCNC: 140 MMOL/L (ref 136–145)
TRIGL SERPL-MCNC: 136 MG/DL (ref 0–150)
VLDLC SERPL CALC-MCNC: 27 MG/DL
WBC # BLD AUTO: 5.2 K/UL (ref 4–11)

## 2024-04-04 PROCEDURE — 99214 OFFICE O/P EST MOD 30 MIN: CPT | Performed by: NURSE PRACTITIONER

## 2024-04-04 PROCEDURE — 3077F SYST BP >= 140 MM HG: CPT | Performed by: NURSE PRACTITIONER

## 2024-04-04 PROCEDURE — 3079F DIAST BP 80-89 MM HG: CPT | Performed by: NURSE PRACTITIONER

## 2024-04-04 RX ORDER — AMITRIPTYLINE HYDROCHLORIDE 10 MG/1
10 TABLET, FILM COATED ORAL NIGHTLY
Qty: 30 TABLET | Refills: 0 | Status: SHIPPED | OUTPATIENT
Start: 2024-04-04

## 2024-04-04 RX ORDER — CELECOXIB 200 MG/1
CAPSULE ORAL
Qty: 90 CAPSULE | Refills: 3 | Status: SHIPPED | OUTPATIENT
Start: 2024-04-04

## 2024-04-04 RX ORDER — FUROSEMIDE 20 MG/1
20 TABLET ORAL DAILY
Qty: 90 TABLET | Refills: 3 | Status: SHIPPED | OUTPATIENT
Start: 2024-04-04

## 2024-04-04 RX ORDER — BEMPEDOIC ACID 180 MG/1
1 TABLET, FILM COATED ORAL DAILY
Qty: 90 TABLET | Refills: 3 | Status: SHIPPED | OUTPATIENT
Start: 2024-04-04

## 2024-04-04 ASSESSMENT — ENCOUNTER SYMPTOMS
BLOOD IN STOOL: 0
BACK PAIN: 1
CHEST TIGHTNESS: 0
CONSTIPATION: 0
DIARRHEA: 0
COUGH: 0

## 2024-04-04 NOTE — PROGRESS NOTES
Rosuvastatin Other (See Comments)     Allopecia      Terbinafine Hcl Rash     Past Medical History:   Diagnosis Date    COVID-19     Hypertension     Thoracogenic scoliosis of thoracolumbar region     Unspecified sleep apnea      Past Surgical History:   Procedure Laterality Date    BREAST SURGERY       SECTION      COLONOSCOPY      POLYP    COLONOSCOPY  2017    polyp    CYST REMOVAL      HIP SURGERY Right     hip replacement     KNEE SURGERY Left 2019    knee replacement, [place]    TONSILLECTOMY AND ADENOIDECTOMY      TUBAL LIGATION       Social History     Tobacco Use    Smoking status: Former     Current packs/day: 0.00     Average packs/day: 1 pack/day for 25.0 years (25.0 ttl pk-yrs)     Types: Cigarettes     Start date: 1978     Quit date: 2003     Years since quittin.8    Smokeless tobacco: Never   Substance Use Topics    Alcohol use: Yes     Alcohol/week: 0.0 standard drinks of alcohol     Comment: occasionally     History reviewed. No pertinent family history.    Vitals:    24 0711 24 0712 24 0740   BP: (!) 144/88 (!) 156/88 (!) 160/94   Pulse: 86     Temp: 97.6 °F (36.4 °C)     TempSrc: Infrared     SpO2: 96%     Weight: 107.5 kg (237 lb)       Estimated body mass index is 40.68 kg/m² as calculated from the following:    Height as of 23: 1.626 m (5' 4\").    Weight as of this encounter: 107.5 kg (237 lb).    Physical Exam  Vitals reviewed.   Constitutional:       Appearance: Normal appearance.   HENT:      Head: Normocephalic.   Neck:      Vascular: No carotid bruit.   Cardiovascular:      Rate and Rhythm: Normal rate and regular rhythm.      Pulses: Normal pulses.      Heart sounds: Normal heart sounds. No murmur heard.     No gallop.   Pulmonary:      Effort: Pulmonary effort is normal.      Breath sounds: Normal breath sounds.   Abdominal:      General: Abdomen is flat.      Palpations: Abdomen is soft.   Musculoskeletal:      Thoracic back:

## 2024-04-05 RX ORDER — HYDROCODONE BITARTRATE AND ACETAMINOPHEN 5; 325 MG/1; MG/1
1 TABLET ORAL EVERY 6 HOURS PRN
Qty: 12 TABLET | Refills: 0 | Status: SHIPPED | OUTPATIENT
Start: 2024-04-05 | End: 2024-10-02

## 2024-04-05 NOTE — TELEPHONE ENCOUNTER
Submitted PA for Nexletol 180MG tablets  Via CMM  Key: [id number]  STATUS: APPROVED  Coverage Starts on: 4/5/2024 12:00:00 AM  Coverage Ends on: 4/5/2025 12:00:00 AM     If this requires a response please respond to the pool ( P [facility]X [facility] MEDICATION PRE-AUTH).      Thank you please advise patient.

## 2024-04-05 NOTE — RESULT ENCOUNTER NOTE
No anemia/ infection. Normal kidney/ liver function. Cholesterol is better from last year. No protein in urine    Overall good report today.  Thanks for coming in.

## 2024-05-06 ENCOUNTER — OFFICE VISIT (OUTPATIENT)
Dept: FAMILY MEDICINE CLINIC | Age: 64
End: 2024-05-06
Payer: COMMERCIAL

## 2024-05-06 VITALS
DIASTOLIC BLOOD PRESSURE: 82 MMHG | TEMPERATURE: 97.1 F | OXYGEN SATURATION: 98 % | HEART RATE: 67 BPM | SYSTOLIC BLOOD PRESSURE: 138 MMHG

## 2024-05-06 DIAGNOSIS — M16.0 PRIMARY OSTEOARTHRITIS OF BOTH HIPS: ICD-10-CM

## 2024-05-06 DIAGNOSIS — M43.10 ACQUIRED SPONDYLOLISTHESIS: ICD-10-CM

## 2024-05-06 DIAGNOSIS — E78.5 HYPERLIPIDEMIA, UNSPECIFIED HYPERLIPIDEMIA TYPE: ICD-10-CM

## 2024-05-06 DIAGNOSIS — M54.41 CHRONIC BILATERAL LOW BACK PAIN WITH RIGHT-SIDED SCIATICA: ICD-10-CM

## 2024-05-06 DIAGNOSIS — G89.29 CHRONIC BILATERAL LOW BACK PAIN WITH RIGHT-SIDED SCIATICA: ICD-10-CM

## 2024-05-06 DIAGNOSIS — I10 ESSENTIAL HYPERTENSION: Primary | ICD-10-CM

## 2024-05-06 PROCEDURE — 3075F SYST BP GE 130 - 139MM HG: CPT | Performed by: NURSE PRACTITIONER

## 2024-05-06 PROCEDURE — 99213 OFFICE O/P EST LOW 20 MIN: CPT | Performed by: NURSE PRACTITIONER

## 2024-05-06 PROCEDURE — 3079F DIAST BP 80-89 MM HG: CPT | Performed by: NURSE PRACTITIONER

## 2024-05-06 RX ORDER — CELECOXIB 200 MG/1
CAPSULE ORAL
Qty: 90 CAPSULE | Refills: 3 | Status: SHIPPED | OUTPATIENT
Start: 2024-05-06

## 2024-05-06 RX ORDER — AMITRIPTYLINE HYDROCHLORIDE 10 MG/1
10 TABLET, FILM COATED ORAL NIGHTLY
Qty: 90 TABLET | Refills: 3 | Status: SHIPPED | OUTPATIENT
Start: 2024-05-06 | End: 2024-05-09

## 2024-05-06 RX ORDER — LISINOPRIL 5 MG/1
TABLET ORAL
Qty: 90 TABLET | Refills: 3 | Status: SHIPPED | OUTPATIENT
Start: 2024-05-06

## 2024-05-06 ASSESSMENT — ENCOUNTER SYMPTOMS
CONSTIPATION: 0
COUGH: 0
CHEST TIGHTNESS: 0
BACK PAIN: 1
SHORTNESS OF BREATH: 0
DIARRHEA: 0
BLOOD IN STOOL: 0

## 2024-05-06 NOTE — PROGRESS NOTES
5/6/2024    Chief Complaint   Patient presents with    Hypertension    feet pain     Hyperlipidemia     Is not able to afford nexletol, has not been on it for a month       Leonie Jones is a 63 y.o. female, presents today for:      ASSESSMENT/PLAN:  1. Essential hypertension  Continue Lisinopril  Monitor food/ salt intake  Encouraged exercise  as tolerated due to chronic back pain    2. Hyperlipidemia, unspecified hyperlipidemia type  Controlled today  Now taking bergemot and black seed oil  No longer taking Nexletol due to cost  Encouraged cardiac diet  Prior Atorvastatin/ Rosuvastatin cause Allopecia 2020 and again in 2022  ASCVD 5.8% 10/2023,  3/2023    3. Acquired spondylolisthesis  Continues to have severe limiting back pain which will intermittently flare with strenuous activities such as Spring clean up.  Requesting short course of Norco to help.  Declining Pain Management referral as her pain, while limiting is usually controlled with Celebrex.  Understands potential Cardiac side effects of medications.  Mild improvement with Amitriptyline started 4 weeks ago.    Prior Meds: Gabapentin- \"felt like I was in a daze\", will need lower doses if she needs again.  Continue Celebrex and PRN Lidoderm patches.   2021 PAULETTE and nerve blocks without success.  Previously considered ablation in 2023.   Intrmittent use of Poke berries for back pain- feels much better than Celebrex.   PDMP checked today, Medication Contract not signed today as patient did not feel she needed today  No prior imaging on file.  Previously followed with [facility].  Will attempt to obtain MRIs to complete our documentation    4. Chronic bilateral low back pain with right-sided sciatica  See notes above  - amitriptyline (ELAVIL) 10 MG tablet; Take 1 tablet by mouth nightly  Dispense: 30 tablet; Refill: 0    No follow-ups on file.    Presenting today for Lisinopril follow up and Amitriptyline 4 week follow up.     Having trouble findings

## 2024-05-07 DIAGNOSIS — G89.29 CHRONIC BILATERAL LOW BACK PAIN WITH RIGHT-SIDED SCIATICA: ICD-10-CM

## 2024-05-07 DIAGNOSIS — M54.41 CHRONIC BILATERAL LOW BACK PAIN WITH RIGHT-SIDED SCIATICA: ICD-10-CM

## 2024-05-09 RX ORDER — AMITRIPTYLINE HYDROCHLORIDE 10 MG/1
10 TABLET, FILM COATED ORAL NIGHTLY
Qty: 30 TABLET | Refills: 5 | Status: SHIPPED | OUTPATIENT
Start: 2024-05-09

## 2024-06-05 ENCOUNTER — HOSPITAL ENCOUNTER (OUTPATIENT)
Dept: WOMENS IMAGING | Age: 64
Discharge: HOME | End: 2024-06-05
Payer: COMMERCIAL

## 2024-06-05 VITALS — WEIGHT: 229 LBS | BODY MASS INDEX: 39.09 KG/M2 | HEIGHT: 64 IN

## 2024-06-05 DIAGNOSIS — Z12.31 SCREENING MAMMOGRAM, ENCOUNTER FOR: ICD-10-CM

## 2024-06-05 PROCEDURE — 77063 BREAST TOMOSYNTHESIS BI: CPT

## 2025-01-08 ASSESSMENT — PATIENT HEALTH QUESTIONNAIRE - PHQ9
SUM OF ALL RESPONSES TO PHQ9 QUESTIONS 1 & 2: 0
2. FEELING DOWN, DEPRESSED OR HOPELESS: NOT AT ALL
SUM OF ALL RESPONSES TO PHQ QUESTIONS 1-9: 0
SUM OF ALL RESPONSES TO PHQ QUESTIONS 1-9: 0
1. LITTLE INTEREST OR PLEASURE IN DOING THINGS: NOT AT ALL
2. FEELING DOWN, DEPRESSED OR HOPELESS: NOT AT ALL
SUM OF ALL RESPONSES TO PHQ QUESTIONS 1-9: 0
SUM OF ALL RESPONSES TO PHQ QUESTIONS 1-9: 0
SUM OF ALL RESPONSES TO PHQ9 QUESTIONS 1 & 2: 0
1. LITTLE INTEREST OR PLEASURE IN DOING THINGS: NOT AT ALL

## 2025-01-09 ENCOUNTER — OFFICE VISIT (OUTPATIENT)
Dept: FAMILY MEDICINE CLINIC | Age: 65
End: 2025-01-09
Payer: MEDICARE

## 2025-01-09 VITALS
DIASTOLIC BLOOD PRESSURE: 82 MMHG | HEART RATE: 72 BPM | WEIGHT: 240 LBS | BODY MASS INDEX: 41.2 KG/M2 | SYSTOLIC BLOOD PRESSURE: 134 MMHG | TEMPERATURE: 97.6 F | OXYGEN SATURATION: 97 %

## 2025-01-09 DIAGNOSIS — I10 ESSENTIAL HYPERTENSION: Primary | ICD-10-CM

## 2025-01-09 DIAGNOSIS — D12.5 ADENOMATOUS POLYP OF SIGMOID COLON: ICD-10-CM

## 2025-01-09 DIAGNOSIS — Z87.891 PERSONAL HISTORY OF TOBACCO USE, PRESENTING HAZARDS TO HEALTH: ICD-10-CM

## 2025-01-09 DIAGNOSIS — M43.10 ACQUIRED SPONDYLOLISTHESIS: ICD-10-CM

## 2025-01-09 DIAGNOSIS — E66.01 CLASS 3 SEVERE OBESITY DUE TO EXCESS CALORIES WITH SERIOUS COMORBIDITY AND BODY MASS INDEX (BMI) OF 40.0 TO 44.9 IN ADULT: ICD-10-CM

## 2025-01-09 DIAGNOSIS — E78.5 HYPERLIPIDEMIA, UNSPECIFIED HYPERLIPIDEMIA TYPE: ICD-10-CM

## 2025-01-09 DIAGNOSIS — E66.813 CLASS 3 SEVERE OBESITY DUE TO EXCESS CALORIES WITH SERIOUS COMORBIDITY AND BODY MASS INDEX (BMI) OF 40.0 TO 44.9 IN ADULT: ICD-10-CM

## 2025-01-09 LAB
CREAT UR-MCNC: 68.4 MG/DL (ref 28–259)
MICROALBUMIN UR DL<=1MG/L-MCNC: <1.2 MG/DL
MICROALBUMIN/CREAT UR: NORMAL MG/G (ref 0–30)

## 2025-01-09 PROCEDURE — G8427 DOCREV CUR MEDS BY ELIG CLIN: HCPCS | Performed by: NURSE PRACTITIONER

## 2025-01-09 PROCEDURE — 1090F PRES/ABSN URINE INCON ASSESS: CPT | Performed by: NURSE PRACTITIONER

## 2025-01-09 PROCEDURE — 3079F DIAST BP 80-89 MM HG: CPT | Performed by: NURSE PRACTITIONER

## 2025-01-09 PROCEDURE — G8417 CALC BMI ABV UP PARAM F/U: HCPCS | Performed by: NURSE PRACTITIONER

## 2025-01-09 PROCEDURE — 99214 OFFICE O/P EST MOD 30 MIN: CPT | Performed by: NURSE PRACTITIONER

## 2025-01-09 PROCEDURE — 3075F SYST BP GE 130 - 139MM HG: CPT | Performed by: NURSE PRACTITIONER

## 2025-01-09 PROCEDURE — 1036F TOBACCO NON-USER: CPT | Performed by: NURSE PRACTITIONER

## 2025-01-09 PROCEDURE — 1123F ACP DISCUSS/DSCN MKR DOCD: CPT | Performed by: NURSE PRACTITIONER

## 2025-01-09 PROCEDURE — G8400 PT W/DXA NO RESULTS DOC: HCPCS | Performed by: NURSE PRACTITIONER

## 2025-01-09 PROCEDURE — 3017F COLORECTAL CA SCREEN DOC REV: CPT | Performed by: NURSE PRACTITIONER

## 2025-01-09 RX ORDER — TRIAMCINOLONE ACETONIDE 1 MG/G
CREAM TOPICAL
COMMUNITY
Start: 2024-10-22

## 2025-01-09 SDOH — ECONOMIC STABILITY: FOOD INSECURITY: WITHIN THE PAST 12 MONTHS, THE FOOD YOU BOUGHT JUST DIDN'T LAST AND YOU DIDN'T HAVE MONEY TO GET MORE.: NEVER TRUE

## 2025-01-09 SDOH — ECONOMIC STABILITY: FOOD INSECURITY: WITHIN THE PAST 12 MONTHS, YOU WORRIED THAT YOUR FOOD WOULD RUN OUT BEFORE YOU GOT MONEY TO BUY MORE.: NEVER TRUE

## 2025-01-09 NOTE — PATIENT INSTRUCTIONS
Dear Leonie,    Thank you for coming in.  We appreciate your time and effort in helping us with your care.  Here are some follow up items following our discussion:    Vaccines to Consider: TDaP, Shingles, Influenza, Pneumonia, COVID  Let Jesusita know about colonoscopy  Mammogram due 2026  Look Up Pelvic Floor therapy (stress incontinence)    Please compare this printed medication list with your medications at home to be sure they are the same.  If you have any medications that are different please contact us immediately at [phone].  Or you can send us a [employer] message.      If you need to schedule imaging or testing you can call [facility]'s Main Scheduling Line at [phone], option 2    Also review your allergies that we have listed, these may also include medications that you have not been able to tolerate, make sure everything listed is correct. If you have any allergies that are different please contact us immediately at [phone].    You may receive a survey in the mail or by email asking about your experience during your visit today. Please complete and return to us so we know how we are serving you.

## 2025-01-09 NOTE — PROGRESS NOTES
Affect: Mood normal.         Behavior: Behavior normal.         Patient's questions answered and concerns addressed. Patient agrees to plan of care.        Electronically signed by ALKA Winters CNP on 1/9/2025 at 8:03 AM

## 2025-01-16 ENCOUNTER — NURSE ONLY (OUTPATIENT)
Dept: FAMILY MEDICINE CLINIC | Age: 65
End: 2025-01-16
Payer: MEDICARE

## 2025-01-16 DIAGNOSIS — I10 ESSENTIAL HYPERTENSION: ICD-10-CM

## 2025-01-16 DIAGNOSIS — E78.5 HYPERLIPIDEMIA, UNSPECIFIED HYPERLIPIDEMIA TYPE: ICD-10-CM

## 2025-01-16 PROCEDURE — 36415 COLL VENOUS BLD VENIPUNCTURE: CPT | Performed by: NURSE PRACTITIONER

## 2025-01-16 NOTE — PROGRESS NOTES
Blank blood out  rt hand with 23 gauge Butterfly needle, without incident, applied pressure to draw site and applied bandaid, blank 2 tubes.

## 2025-01-17 LAB
ALBUMIN SERPL-MCNC: 4.7 G/DL (ref 3.4–5)
ALBUMIN/GLOB SERPL: 1.7 {RATIO} (ref 1.1–2.2)
ALP SERPL-CCNC: 83 U/L (ref 40–129)
ALT SERPL-CCNC: 31 U/L (ref 10–40)
ANION GAP SERPL CALCULATED.3IONS-SCNC: 11 MMOL/L (ref 3–16)
AST SERPL-CCNC: 26 U/L (ref 15–37)
BASOPHILS # BLD: 0.1 K/UL (ref 0–0.2)
BASOPHILS NFR BLD: 1.4 %
BILIRUB SERPL-MCNC: 0.4 MG/DL (ref 0–1)
BUN SERPL-MCNC: 16 MG/DL (ref 7–20)
CALCIUM SERPL-MCNC: 10.6 MG/DL (ref 8.3–10.6)
CHLORIDE SERPL-SCNC: 107 MMOL/L (ref 99–110)
CHOLEST SERPL-MCNC: 245 MG/DL (ref 0–199)
CO2 SERPL-SCNC: 24 MMOL/L (ref 21–32)
CREAT SERPL-MCNC: 0.9 MG/DL (ref 0.6–1.2)
DEPRECATED RDW RBC AUTO: 14.6 % (ref 12.4–15.4)
EOSINOPHIL # BLD: 0.1 K/UL (ref 0–0.6)
EOSINOPHIL NFR BLD: 2.4 %
GFR SERPLBLD CREATININE-BSD FMLA CKD-EPI: 71 ML/MIN/{1.73_M2}
GLUCOSE SERPL-MCNC: 107 MG/DL (ref 70–99)
HCT VFR BLD AUTO: 46.2 % (ref 36–48)
HDLC SERPL-MCNC: 56 MG/DL (ref 40–60)
HGB BLD-MCNC: 15.7 G/DL (ref 12–16)
LDLC SERPL CALC-MCNC: 167 MG/DL
LYMPHOCYTES # BLD: 2.1 K/UL (ref 1–5.1)
LYMPHOCYTES NFR BLD: 41.1 %
MCH RBC QN AUTO: 32.7 PG (ref 26–34)
MCHC RBC AUTO-ENTMCNC: 34 G/DL (ref 31–36)
MCV RBC AUTO: 96 FL (ref 80–100)
MONOCYTES # BLD: 0.3 K/UL (ref 0–1.3)
MONOCYTES NFR BLD: 6.4 %
NEUTROPHILS # BLD: 2.4 K/UL (ref 1.7–7.7)
NEUTROPHILS NFR BLD: 48.7 %
PLATELET # BLD AUTO: 219 K/UL (ref 135–450)
PMV BLD AUTO: 9.1 FL (ref 5–10.5)
POTASSIUM SERPL-SCNC: 4.2 MMOL/L (ref 3.5–5.1)
PROT SERPL-MCNC: 7.5 G/DL (ref 6.4–8.2)
RBC # BLD AUTO: 4.81 M/UL (ref 4–5.2)
SODIUM SERPL-SCNC: 142 MMOL/L (ref 136–145)
TRIGL SERPL-MCNC: 109 MG/DL (ref 0–150)
VLDLC SERPL CALC-MCNC: 22 MG/DL
WBC # BLD AUTO: 5 K/UL (ref 4–11)

## 2025-02-11 ENCOUNTER — OFFICE VISIT (OUTPATIENT)
Dept: FAMILY MEDICINE CLINIC | Age: 65
End: 2025-02-11
Payer: MEDICARE

## 2025-02-11 VITALS
OXYGEN SATURATION: 96 % | DIASTOLIC BLOOD PRESSURE: 82 MMHG | SYSTOLIC BLOOD PRESSURE: 138 MMHG | TEMPERATURE: 97.2 F | HEART RATE: 65 BPM

## 2025-02-11 DIAGNOSIS — J40 BRONCHITIS: ICD-10-CM

## 2025-02-11 DIAGNOSIS — J02.9 SORE THROAT: Primary | ICD-10-CM

## 2025-02-11 LAB — S PYO AG THROAT QL: NORMAL

## 2025-02-11 PROCEDURE — 3075F SYST BP GE 130 - 139MM HG: CPT | Performed by: NURSE PRACTITIONER

## 2025-02-11 PROCEDURE — 99213 OFFICE O/P EST LOW 20 MIN: CPT | Performed by: NURSE PRACTITIONER

## 2025-02-11 PROCEDURE — 1036F TOBACCO NON-USER: CPT | Performed by: NURSE PRACTITIONER

## 2025-02-11 PROCEDURE — G8427 DOCREV CUR MEDS BY ELIG CLIN: HCPCS | Performed by: NURSE PRACTITIONER

## 2025-02-11 PROCEDURE — 3017F COLORECTAL CA SCREEN DOC REV: CPT | Performed by: NURSE PRACTITIONER

## 2025-02-11 PROCEDURE — 1090F PRES/ABSN URINE INCON ASSESS: CPT | Performed by: NURSE PRACTITIONER

## 2025-02-11 PROCEDURE — G8400 PT W/DXA NO RESULTS DOC: HCPCS | Performed by: NURSE PRACTITIONER

## 2025-02-11 PROCEDURE — 1123F ACP DISCUSS/DSCN MKR DOCD: CPT | Performed by: NURSE PRACTITIONER

## 2025-02-11 PROCEDURE — 87880 STREP A ASSAY W/OPTIC: CPT | Performed by: NURSE PRACTITIONER

## 2025-02-11 PROCEDURE — 3079F DIAST BP 80-89 MM HG: CPT | Performed by: NURSE PRACTITIONER

## 2025-02-11 PROCEDURE — G8417 CALC BMI ABV UP PARAM F/U: HCPCS | Performed by: NURSE PRACTITIONER

## 2025-02-11 RX ORDER — DOXYCYCLINE HYCLATE 100 MG
100 TABLET ORAL 2 TIMES DAILY
Qty: 10 TABLET | Refills: 0 | Status: SHIPPED | OUTPATIENT
Start: 2025-02-11 | End: 2025-02-16

## 2025-02-11 NOTE — PROGRESS NOTES
2/11/2025    Chief Complaint   Patient presents with    Pharyngitis    Cough       Leonie Jones is a 64 y.o. female, presents today for:    Assessment & Plan  1. Pharyngitis.  She has been experiencing a sore throat for about 5 days, which has not improved. There is no redness observed in the throat, but there is evidence of postnasal drainage. She reports no body aches, nausea, vomiting, or current fever. She had a fever earlier in her illness, which started around 06/28/2024. She has not taken any over-the-counter medications, only herbs. She has been coughing a lot but does not feel significant drainage. A prescription for doxycycline will be provided to manage the condition. She is advised to increase her intake of probiotics while on this medication. If her symptoms improve with doxycycline, steroids may not be necessary. If she feels better but still wants steroids, she can call or send a message through [employer].    2. Cough.  She has had a persistent cough for about 3 weeks. The cough is productive, and there is fluid behind her ears, with the right side being worse. She reports no dizziness or ear pain currently. Doxycycline will be prescribed to address the cough and potential bronchitis. If the cough improves with doxycycline, no further treatment may be necessary. If she feels better but still wants steroids, she can call or send a message through [employer].      Return if symptoms worsen or fail to improve.      History of Present Illness  The patient is a 64-year-old female who presents today for pharyngitis and a cough.    She has been experiencing symptoms since 01/28/2024, approximately 3 weeks ago. Her influenza-like symptoms have improved, but she continues to experience a persistent cough. She has not sought testing for influenza or COVID-19. She reports that her current illness is more severe than her previous COVID-19 infection. She has not taken any over-the-counter medications but has

## 2025-02-13 ENCOUNTER — PATIENT MESSAGE (OUTPATIENT)
Dept: FAMILY MEDICINE CLINIC | Age: 65
End: 2025-02-13

## 2025-02-13 ENCOUNTER — TELEPHONE (OUTPATIENT)
Dept: FAMILY MEDICINE CLINIC | Age: 65
End: 2025-02-13

## 2025-02-13 DIAGNOSIS — J40 BRONCHITIS: Primary | ICD-10-CM

## 2025-02-13 RX ORDER — METHYLPREDNISOLONE 4 MG/1
TABLET ORAL
Qty: 1 KIT | Refills: 0 | Status: SHIPPED | OUTPATIENT
Start: 2025-02-13 | End: 2025-02-19

## 2025-02-13 NOTE — TELEPHONE ENCOUNTER
Was seen Tuesday for sore throat and cough. Patient is taking Doxycycline. She is not having any improvement other than she now has a dry cough. She was wondering if you could send Prednisone to [facility] in [place]?

## 2025-02-18 ENCOUNTER — TELEPHONE (OUTPATIENT)
Dept: FAMILY MEDICINE CLINIC | Age: 65
End: 2025-02-18

## 2025-02-18 DIAGNOSIS — J40 BRONCHITIS: ICD-10-CM

## 2025-02-18 DIAGNOSIS — J02.9 SORE THROAT: Primary | ICD-10-CM

## 2025-02-18 RX ORDER — AZITHROMYCIN 250 MG/1
TABLET, FILM COATED ORAL
Qty: 6 TABLET | Refills: 0 | Status: SHIPPED | OUTPATIENT
Start: 2025-02-18 | End: 2025-02-28

## 2025-02-18 NOTE — TELEPHONE ENCOUNTER
I sent her some Azithromycin to the pharmacy.  It should treat both.  Let me know next week if not better.  Needs to take 2 pills today then 1 pill therafter until finished with meds

## 2025-02-18 NOTE — TELEPHONE ENCOUNTER
Pt calling in regards to her sore throat. Pt feels like it was not her throat that was an issue, instead maybe it's an ear thing or a sinus infection. Pt has taken Benadryl and Sudafed and is on her last dose of the prednisone tonight. Pt states she is not feeling better. Asking for your recommendations or if something else could be called in, please sent to [facility] in [place]. Please advise.

## 2025-02-26 ENCOUNTER — TELEPHONE (OUTPATIENT)
Dept: FAMILY MEDICINE CLINIC | Age: 65
End: 2025-02-26

## 2025-02-26 DIAGNOSIS — R10.9 ABDOMINAL CRAMPING: Primary | ICD-10-CM

## 2025-02-26 RX ORDER — DICYCLOMINE HYDROCHLORIDE 10 MG/1
10 CAPSULE ORAL 4 TIMES DAILY PRN
Qty: 10 CAPSULE | Refills: 0 | Status: SHIPPED | OUTPATIENT
Start: 2025-02-26

## 2025-02-26 NOTE — TELEPHONE ENCOUNTER
Patient notified. States the nausea has improved since vomiting has stopped. Would like dicyclomine sent to [facility].

## 2025-02-26 NOTE — TELEPHONE ENCOUNTER
Symptoms should slowly resolve but if she would like some medications to help with the nausea I can send Zofran.  For the cramping we can try Dicyclomine but it can cause constipation.  Usually with food poisoning I say to try to let it come out in the form of diarrhea instead of staying.  But sometimes the diarrhea is quite severe so trying something like Dicyclomine to help with cramping and reduce the number of bowel movements is a blessing

## 2025-04-14 DIAGNOSIS — M16.0 PRIMARY OSTEOARTHRITIS OF BOTH HIPS: ICD-10-CM

## 2025-04-14 RX ORDER — FUROSEMIDE 20 MG/1
20 TABLET ORAL DAILY
Qty: 90 TABLET | Refills: 3 | Status: SHIPPED | OUTPATIENT
Start: 2025-04-14

## 2025-04-18 ENCOUNTER — OFFICE VISIT (OUTPATIENT)
Age: 65
End: 2025-04-18
Payer: MEDICARE

## 2025-04-18 VITALS
TEMPERATURE: 97 F | BODY MASS INDEX: 41.15 KG/M2 | SYSTOLIC BLOOD PRESSURE: 175 MMHG | OXYGEN SATURATION: 97 % | DIASTOLIC BLOOD PRESSURE: 101 MMHG | HEART RATE: 76 BPM | HEIGHT: 64 IN | WEIGHT: 241 LBS | RESPIRATION RATE: 16 BRPM

## 2025-04-18 DIAGNOSIS — G47.33 OSA (OBSTRUCTIVE SLEEP APNEA): Primary | ICD-10-CM

## 2025-04-18 PROCEDURE — G8427 DOCREV CUR MEDS BY ELIG CLIN: HCPCS | Performed by: INTERNAL MEDICINE

## 2025-04-18 PROCEDURE — G8417 CALC BMI ABV UP PARAM F/U: HCPCS | Performed by: INTERNAL MEDICINE

## 2025-04-18 PROCEDURE — 1036F TOBACCO NON-USER: CPT | Performed by: INTERNAL MEDICINE

## 2025-04-18 PROCEDURE — 3017F COLORECTAL CA SCREEN DOC REV: CPT | Performed by: INTERNAL MEDICINE

## 2025-04-18 PROCEDURE — G8400 PT W/DXA NO RESULTS DOC: HCPCS | Performed by: INTERNAL MEDICINE

## 2025-04-18 PROCEDURE — 1090F PRES/ABSN URINE INCON ASSESS: CPT | Performed by: INTERNAL MEDICINE

## 2025-04-18 PROCEDURE — 3077F SYST BP >= 140 MM HG: CPT | Performed by: INTERNAL MEDICINE

## 2025-04-18 PROCEDURE — 99204 OFFICE O/P NEW MOD 45 MIN: CPT | Performed by: INTERNAL MEDICINE

## 2025-04-18 PROCEDURE — 3080F DIAST BP >= 90 MM HG: CPT | Performed by: INTERNAL MEDICINE

## 2025-04-18 PROCEDURE — 1123F ACP DISCUSS/DSCN MKR DOCD: CPT | Performed by: INTERNAL MEDICINE

## 2025-04-18 ASSESSMENT — SLEEP AND FATIGUE QUESTIONNAIRES
HOW LIKELY ARE YOU TO NOD OFF OR FALL ASLEEP WHILE SITTING QUIETLY AFTER LUNCH WITHOUT ALCOHOL: SLIGHT CHANCE OF DOZING
HOW LIKELY ARE YOU TO NOD OFF OR FALL ASLEEP IN A CAR, WHILE STOPPED FOR A FEW MINUTES IN TRAFFIC: WOULD NEVER DOZE
HOW LIKELY ARE YOU TO NOD OFF OR FALL ASLEEP WHILE SITTING INACTIVE IN A PUBLIC PLACE: SLIGHT CHANCE OF DOZING
HOW LIKELY ARE YOU TO NOD OFF OR FALL ASLEEP WHILE WATCHING TV: HIGH CHANCE OF DOZING
HOW LIKELY ARE YOU TO NOD OFF OR FALL ASLEEP WHILE SITTING AND READING: HIGH CHANCE OF DOZING
HOW LIKELY ARE YOU TO NOD OFF OR FALL ASLEEP WHILE SITTING QUIETLY AFTER LUNCH WITHOUT ALCOHOL: SLIGHT CHANCE OF DOZING
HOW LIKELY ARE YOU TO NOD OFF OR FALL ASLEEP WHILE SITTING AND READING: HIGH CHANCE OF DOZING
HOW LIKELY ARE YOU TO NOD OFF OR FALL ASLEEP WHILE LYING DOWN TO REST IN THE AFTERNOON WHEN CIRCUMSTANCES PERMIT: SLIGHT CHANCE OF DOZING
HOW LIKELY ARE YOU TO NOD OFF OR FALL ASLEEP WHEN YOU ARE A PASSENGER IN A CAR FOR AN HOUR WITHOUT A BREAK: SLIGHT CHANCE OF DOZING
HOW LIKELY ARE YOU TO NOD OFF OR FALL ASLEEP WHILE SITTING INACTIVE IN A PUBLIC PLACE: SLIGHT CHANCE OF DOZING
HOW LIKELY ARE YOU TO NOD OFF OR FALL ASLEEP WHILE WATCHING TV: HIGH CHANCE OF DOZING
ESS TOTAL SCORE: 10
HOW LIKELY ARE YOU TO NOD OFF OR FALL ASLEEP WHEN YOU ARE A PASSENGER IN A CAR FOR AN HOUR WITHOUT A BREAK: SLIGHT CHANCE OF DOZING
HOW LIKELY ARE YOU TO NOD OFF OR FALL ASLEEP WHILE SITTING AND TALKING TO SOMEONE: WOULD NEVER DOZE
HOW LIKELY ARE YOU TO NOD OFF OR FALL ASLEEP IN A CAR, WHILE STOPPED FOR A FEW MINUTES IN TRAFFIC: WOULD NEVER DOZE
HOW LIKELY ARE YOU TO NOD OFF OR FALL ASLEEP WHILE SITTING AND TALKING TO SOMEONE: WOULD NEVER DOZE
HOW LIKELY ARE YOU TO NOD OFF OR FALL ASLEEP WHILE LYING DOWN TO REST IN THE AFTERNOON WHEN CIRCUMSTANCES PERMIT: SLIGHT CHANCE OF DOZING

## 2025-04-18 NOTE — PROGRESS NOTES
SLEEP MEDICINE CONSULT NOTE  CC & Referring provider: Patient is being seen at the request of self for a consultation to evaluate for KEN.     Presenting HPI 4/18/25:   History of Present Illness  Needs new CPAP machine.  She has had a persistent sore throat since 02/2025, which she attributes to her CPAP machine. Has old Devilbiss sleep cube set at 15 with no humidifier.  Reports severe dry mouth upon awakening, causing oral tissues to adhere together. Believes CPAP pressure setting may be too high. Abstained from CPAP use for 5 days while babysitting grandchildren, noting throat improvement. CPAP machine lacks humidifier; uses nasal mask. Does have mouth leak but uses a self fashioned guard to avoid air escaping from lips.  Using CPAP for ~30 years. Falls asleep on couch around 11 PM, then transitions to bed. Occasionally experiences hallucinations while falling asleep. No episodes of falling asleep while driving since starting CPAP.  Open to home sleep study.     Consulted ENT specialist who identified a white spot at the back of throat, scheduled for biopsy if no improvement.     ~ 8 hrs of sleep per night  Bedtime 11:59 pm & rise time 8 am  Restroom 1x/night     zero naps during the day    No car wrecks/near wrecks because of the sleepiness or nodding off while driving.           4/18/2025    12:59 PM   Sleep Medicine   Sitting and reading 3   Watching TV 3   Sitting, inactive in a public place (e.g. a theatre or a meeting) 1   As a passenger in a car for an hour without a break 1   Lying down to rest in the afternoon when circumstances permit 1   Sitting and talking to someone 0   Sitting quietly after a lunch without alcohol 1   In a car, while stopped for a few minutes in traffic 0   [place] Sleepiness Score 10    Neck (Inches) 16       Patient-reported        Past Medical History:   Diagnosis Date    COVID-19     Hypertension     Thoracogenic scoliosis of thoracolumbar region     Unspecified sleep apnea

## 2025-04-18 NOTE — PROGRESS NOTES
MA Communication:  The following orders are received by verbal communication from Derrick Rowe MD        Orders include:          HST follow up after testing for 31-90

## 2025-04-18 NOTE — PATIENT INSTRUCTIONS
You could consider CPAP tape or kinesiology tape to help with the lips opening while on CPAP.     What's next:  The Sleep Center at [facility] - [address], [address], [place] [address]  The Sleep center at [address], [address], [place] [address]  Phone for both is: [phone] Fax: [phone]    The sleep lab will call you within the next 1 week to schedule a study (if the sleep lab does not reach you within 1 week, please call them at [phone]).  The sleep study should be done WITHOUT CPAP, ideally you would avoid using CPAP for two days prior to the study in addition to the night of the study.    For at home testing: when you come to  the rental unit, please bring:  I.D. and Insurance card  ** Please note the Home Sleep Test Units are limited. It is a 1 night rental and must be dropped off the following day to ensure you are not billed a late or replacement fee. **After you have repeated the sleep study, we will send in an order for a new machine.      If you have any questions or need to cancel/reschedule your appointment, please call the sleep lab @ [phone]     After you have repeated the sleep study we will send an order to the DME company to obtain a new device.      What's next:  Trying auto-CPAP:  We will ask you which medical supply company (\"DME company\") we should send the CPAP prescription to (see list of companies below).  The best way to choose a company is by calling your insurance and asking which DME companies are in network for you, and choosing from those options.  That DME company will get you set up with your machine and equipment.  Please note: you should not be handed a box with a machine.  A therapist should take the machine out of the box, plug it in and show you the features, including the water chamber, humidity settings, ramp settings and filter placement.  You should be fit for a specific mask -- we prefer a small nasal mask if

## 2025-05-05 ENCOUNTER — HOSPITAL ENCOUNTER (OUTPATIENT)
Dept: SLEEP CENTER | Age: 65
Discharge: HOME | End: 2025-05-07
Attending: INTERNAL MEDICINE
Payer: MEDICARE

## 2025-05-05 DIAGNOSIS — G47.33 OSA (OBSTRUCTIVE SLEEP APNEA): ICD-10-CM

## 2025-05-05 PROCEDURE — 95806 SLEEP STUDY UNATT&RESP EFFT: CPT

## 2025-05-07 DIAGNOSIS — G47.33 OSA ON CPAP: Primary | ICD-10-CM

## 2025-05-08 ENCOUNTER — TELEPHONE (OUTPATIENT)
Dept: PULMONOLOGY | Age: 65
End: 2025-05-08

## 2025-05-09 ENCOUNTER — RESULTS FOLLOW-UP (OUTPATIENT)
Dept: PULMONOLOGY | Age: 65
End: 2025-05-09

## 2025-06-20 ENCOUNTER — OFFICE VISIT (OUTPATIENT)
Age: 65
End: 2025-06-20
Payer: MEDICARE

## 2025-06-20 VITALS
DIASTOLIC BLOOD PRESSURE: 88 MMHG | BODY MASS INDEX: 41.48 KG/M2 | TEMPERATURE: 98 F | WEIGHT: 243 LBS | HEART RATE: 80 BPM | SYSTOLIC BLOOD PRESSURE: 146 MMHG | OXYGEN SATURATION: 97 % | HEIGHT: 64 IN | RESPIRATION RATE: 16 BRPM

## 2025-06-20 DIAGNOSIS — G47.33 OSA (OBSTRUCTIVE SLEEP APNEA): Primary | ICD-10-CM

## 2025-06-20 PROCEDURE — 99213 OFFICE O/P EST LOW 20 MIN: CPT | Performed by: INTERNAL MEDICINE

## 2025-06-20 PROCEDURE — 1123F ACP DISCUSS/DSCN MKR DOCD: CPT | Performed by: INTERNAL MEDICINE

## 2025-06-20 PROCEDURE — 3079F DIAST BP 80-89 MM HG: CPT | Performed by: INTERNAL MEDICINE

## 2025-06-20 PROCEDURE — 1036F TOBACCO NON-USER: CPT | Performed by: INTERNAL MEDICINE

## 2025-06-20 PROCEDURE — 3017F COLORECTAL CA SCREEN DOC REV: CPT | Performed by: INTERNAL MEDICINE

## 2025-06-20 PROCEDURE — G8400 PT W/DXA NO RESULTS DOC: HCPCS | Performed by: INTERNAL MEDICINE

## 2025-06-20 PROCEDURE — G8417 CALC BMI ABV UP PARAM F/U: HCPCS | Performed by: INTERNAL MEDICINE

## 2025-06-20 PROCEDURE — 3077F SYST BP >= 140 MM HG: CPT | Performed by: INTERNAL MEDICINE

## 2025-06-20 PROCEDURE — G8427 DOCREV CUR MEDS BY ELIG CLIN: HCPCS | Performed by: INTERNAL MEDICINE

## 2025-06-20 PROCEDURE — 1090F PRES/ABSN URINE INCON ASSESS: CPT | Performed by: INTERNAL MEDICINE

## 2025-06-20 ASSESSMENT — SLEEP AND FATIGUE QUESTIONNAIRES
HOW LIKELY ARE YOU TO NOD OFF OR FALL ASLEEP IN A CAR, WHILE STOPPED FOR A FEW MINUTES IN TRAFFIC: SLIGHT CHANCE OF DOZING
HOW LIKELY ARE YOU TO NOD OFF OR FALL ASLEEP WHILE SITTING QUIETLY AFTER LUNCH WITHOUT ALCOHOL: SLIGHT CHANCE OF DOZING
ESS TOTAL SCORE: 5
HOW LIKELY ARE YOU TO NOD OFF OR FALL ASLEEP WHILE SITTING AND TALKING TO SOMEONE: WOULD NEVER DOZE
HOW LIKELY ARE YOU TO NOD OFF OR FALL ASLEEP WHEN YOU ARE A PASSENGER IN A CAR FOR AN HOUR WITHOUT A BREAK: WOULD NEVER DOZE
HOW LIKELY ARE YOU TO NOD OFF OR FALL ASLEEP WHILE SITTING AND READING: SLIGHT CHANCE OF DOZING
HOW LIKELY ARE YOU TO NOD OFF OR FALL ASLEEP WHILE LYING DOWN TO REST IN THE AFTERNOON WHEN CIRCUMSTANCES PERMIT: MODERATE CHANCE OF DOZING
HOW LIKELY ARE YOU TO NOD OFF OR FALL ASLEEP WHILE WATCHING TV: WOULD NEVER DOZE
HOW LIKELY ARE YOU TO NOD OFF OR FALL ASLEEP WHILE SITTING INACTIVE IN A PUBLIC PLACE: WOULD NEVER DOZE

## 2025-06-20 NOTE — PROGRESS NOTES
SLEEP MEDICINE CONSULT NOTE  CC & Referring provider: Patient is being seen at the request of self for a consultation to evaluate for KEN.     Interval History 6/20/25  - doing well with new machine.  Likes APAP better.         Presenting HPI 4/18/25: Needs new CPAP machine.  She has had a persistent sore throat since 02/2025, which she attributes to her CPAP machine. Has old Devilbiss sleep cube set at 15 with no humidifier.  Reports severe dry mouth upon awakening, causing oral tissues to adhere together. Believes CPAP pressure setting may be too high. Abstained from CPAP use for 5 days while babysitting grandchildren, noting throat improvement. CPAP machine lacks humidifier; uses nasal mask. Does have mouth leak but uses a self fashioned guard to avoid air escaping from lips.  Using CPAP for ~30 years. Falls asleep on couch around 11 PM, then transitions to bed. Occasionally experiences hallucinations while falling asleep. No episodes of falling asleep while driving since starting CPAP.  Open to home sleep study.     Consulted ENT specialist who identified a white spot at the back of throat, scheduled for biopsy if no improvement.     ~ 8 hrs of sleep per night  Bedtime 11:59 pm & rise time 8 am  Restroom 1x/night     zero naps during the day    No car wrecks/near wrecks because of the sleepiness or nodding off while driving.            6/20/2025     1:18 PM 4/18/2025    12:59 PM   Sleep Medicine   Sitting and reading 1 3   Watching TV 0 3   Sitting, inactive in a public place (e.g. a theatre or a meeting) 0 1   As a passenger in a car for an hour without a break 0 1   Lying down to rest in the afternoon when circumstances permit 2 1   Sitting and talking to someone 0 0   Sitting quietly after a lunch without alcohol 1 1   In a car, while stopped for a few minutes in traffic 1 0   [place] Sleepiness Score 5 10    Neck (Inches)  16       Patient-reported        PHYSICAL EXAM:  Blood pressure (!) 146/88, pulse 80,

## 2025-06-20 NOTE — PROGRESS NOTES
MA Communication:  The following orders are received by verbal communication from Derrick Rowe MD        Orders include:          12 mfu KEN

## 2025-06-20 NOTE — PATIENT INSTRUCTIONS
For Patients with Obstructive Sleep Apnea:    Never drive a car or operate a motorized vehicle while drowsy or sleepy.    Maintaining an optimal weight can help limit the severity of sleep apnea.  If you are interested in speaking about tirzepatide (Zepbound), the injection drug for weight loss that has been approved for patients with sleep apnea, let your doctor know.    Treating sleep apnea effectively may help reduce the risk of heart disease, stroke and type II diabetes.     Please remember to bring a list of medications and any CPAP or BiPAP machines to your next appointment with the office.     Out of respect for other patients and providers, we ask that you arrive no later than your scheduled appointment time.  If you arrive later than your appointment time, you may be asked to reschedule your appointment.     Late cancellations result in other patients being unable to utilize the appointment slot to see their physician.  Please avoid cancelling your appointment less than 1 week prior to the appointment date.  Patients with multiple missed appointments within 2 years may be dismissed from the practice.     In the next few weeks, you will be receiving a [facility] your visit today.  Your input is valuable to us & surveys are regularly reviewed by [facility] leadership.  It is very important to us that our patients receive excellent care.  If your experience was excellent, please let us know!  If your experience was not a good one, please tell us so we can make needed corrections. We hope you are comfortable recommending us to others for their healthcare needs.     We [facility] you [facility]